# Patient Record
Sex: FEMALE | Race: BLACK OR AFRICAN AMERICAN | NOT HISPANIC OR LATINO | URBAN - METROPOLITAN AREA
[De-identification: names, ages, dates, MRNs, and addresses within clinical notes are randomized per-mention and may not be internally consistent; named-entity substitution may affect disease eponyms.]

---

## 2023-08-14 ENCOUNTER — OFFICE VISIT (OUTPATIENT)
Dept: PRIMARY CARE | Facility: CLINIC | Age: 63
End: 2023-08-14
Payer: COMMERCIAL

## 2023-08-14 ENCOUNTER — LAB (OUTPATIENT)
Dept: LAB | Facility: LAB | Age: 63
End: 2023-08-14
Payer: COMMERCIAL

## 2023-08-14 VITALS
RESPIRATION RATE: 16 BRPM | HEART RATE: 105 BPM | TEMPERATURE: 97 F | DIASTOLIC BLOOD PRESSURE: 64 MMHG | OXYGEN SATURATION: 98 % | WEIGHT: 185.2 LBS | SYSTOLIC BLOOD PRESSURE: 98 MMHG

## 2023-08-14 DIAGNOSIS — G50.0 TRIGEMINAL NEURALGIA: Primary | ICD-10-CM

## 2023-08-14 DIAGNOSIS — E11.65 TYPE 2 DIABETES MELLITUS WITH HYPERGLYCEMIA, WITHOUT LONG-TERM CURRENT USE OF INSULIN (MULTI): ICD-10-CM

## 2023-08-14 DIAGNOSIS — B37.31 YEAST VAGINITIS: ICD-10-CM

## 2023-08-14 DIAGNOSIS — I10 ESSENTIAL HYPERTENSION: ICD-10-CM

## 2023-08-14 DIAGNOSIS — L08.9 SKIN INFECTION: ICD-10-CM

## 2023-08-14 DIAGNOSIS — E53.8 VITAMIN B12 DEFICIENCY: ICD-10-CM

## 2023-08-14 DIAGNOSIS — Z00.00 HEALTHCARE MAINTENANCE: ICD-10-CM

## 2023-08-14 DIAGNOSIS — E78.2 MIXED HYPERLIPIDEMIA: ICD-10-CM

## 2023-08-14 DIAGNOSIS — E55.9 VITAMIN D DEFICIENCY: ICD-10-CM

## 2023-08-14 PROBLEM — F44.9 CONVERSION DISORDER: Status: ACTIVE | Noted: 2020-09-22

## 2023-08-14 PROBLEM — M25.571 PAIN IN JOINTS OF BOTH FEET: Status: ACTIVE | Noted: 2019-05-08

## 2023-08-14 PROBLEM — E66.811 OBESITY, CLASS I, BMI 30-34.9: Status: ACTIVE | Noted: 2020-02-20

## 2023-08-14 PROBLEM — G89.0 CENTRAL PAIN SYNDROME: Status: ACTIVE | Noted: 2019-09-13

## 2023-08-14 PROBLEM — E66.9 OBESITY, CLASS I, BMI 30-34.9: Status: ACTIVE | Noted: 2020-02-20

## 2023-08-14 PROBLEM — M79.674 PAIN IN TOES OF BOTH FEET: Status: ACTIVE | Noted: 2019-09-11

## 2023-08-14 PROBLEM — Q84.6 NAIL ANOMALY: Status: ACTIVE | Noted: 2019-05-08

## 2023-08-14 PROBLEM — H90.3 SENSORINEURAL HEARING LOSS, BILATERAL: Status: ACTIVE | Noted: 2021-08-17

## 2023-08-14 PROBLEM — M79.675 PAIN IN TOES OF BOTH FEET: Status: ACTIVE | Noted: 2019-09-11

## 2023-08-14 PROBLEM — M25.572 PAIN IN JOINTS OF BOTH FEET: Status: ACTIVE | Noted: 2019-05-08

## 2023-08-14 PROBLEM — L84 CORNS AND CALLOSITIES: Status: ACTIVE | Noted: 2017-11-29

## 2023-08-14 LAB
ALANINE AMINOTRANSFERASE (SGPT) (U/L) IN SER/PLAS: 38 U/L (ref 7–45)
ALBUMIN (G/DL) IN SER/PLAS: 4.3 G/DL (ref 3.4–5)
ALBUMIN (MG/L) IN URINE: 38.3 MG/L
ALBUMIN/CREATININE (UG/MG) IN URINE: 51.5 UG/MG CRT (ref 0–30)
ALKALINE PHOSPHATASE (U/L) IN SER/PLAS: 73 U/L (ref 33–136)
ANION GAP IN SER/PLAS: 14 MMOL/L (ref 10–20)
ASPARTATE AMINOTRANSFERASE (SGOT) (U/L) IN SER/PLAS: 34 U/L (ref 9–39)
BASOPHILS (10*3/UL) IN BLOOD BY AUTOMATED COUNT: 0.06 X10E9/L (ref 0–0.1)
BASOPHILS/100 LEUKOCYTES IN BLOOD BY AUTOMATED COUNT: 0.5 % (ref 0–2)
BILIRUBIN TOTAL (MG/DL) IN SER/PLAS: 0.7 MG/DL (ref 0–1.2)
CALCIUM (MG/DL) IN SER/PLAS: 10.3 MG/DL (ref 8.6–10.6)
CARBON DIOXIDE, TOTAL (MMOL/L) IN SER/PLAS: 28 MMOL/L (ref 21–32)
CHLORIDE (MMOL/L) IN SER/PLAS: 99 MMOL/L (ref 98–107)
CHOLESTEROL (MG/DL) IN SER/PLAS: 107 MG/DL (ref 0–199)
CHOLESTEROL IN HDL (MG/DL) IN SER/PLAS: 40.5 MG/DL
CHOLESTEROL/HDL RATIO: 2.6
CREATININE (MG/DL) IN SER/PLAS: 1.15 MG/DL (ref 0.5–1.05)
CREATININE (MG/DL) IN URINE: 74.3 MG/DL (ref 20–320)
EOSINOPHILS (10*3/UL) IN BLOOD BY AUTOMATED COUNT: 0.3 X10E9/L (ref 0–0.7)
EOSINOPHILS/100 LEUKOCYTES IN BLOOD BY AUTOMATED COUNT: 2.7 % (ref 0–6)
ERYTHROCYTE DISTRIBUTION WIDTH (RATIO) BY AUTOMATED COUNT: 12.7 % (ref 11.5–14.5)
ERYTHROCYTE MEAN CORPUSCULAR HEMOGLOBIN CONCENTRATION (G/DL) BY AUTOMATED: 31.6 G/DL (ref 32–36)
ERYTHROCYTE MEAN CORPUSCULAR VOLUME (FL) BY AUTOMATED COUNT: 91 FL (ref 80–100)
ERYTHROCYTES (10*6/UL) IN BLOOD BY AUTOMATED COUNT: 4.6 X10E12/L (ref 4–5.2)
ESTIMATED AVERAGE GLUCOSE FOR HBA1C: 349 MG/DL
GFR FEMALE: 53 ML/MIN/1.73M2
GLUCOSE (MG/DL) IN SER/PLAS: 350 MG/DL (ref 74–99)
HEMATOCRIT (%) IN BLOOD BY AUTOMATED COUNT: 41.8 % (ref 36–46)
HEMOGLOBIN (G/DL) IN BLOOD: 13.2 G/DL (ref 12–16)
HEMOGLOBIN A1C/HEMOGLOBIN TOTAL IN BLOOD: 13.8 %
IMMATURE GRANULOCYTES/100 LEUKOCYTES IN BLOOD BY AUTOMATED COUNT: 0.3 % (ref 0–0.9)
LDL: 41 MG/DL (ref 0–99)
LEUKOCYTES (10*3/UL) IN BLOOD BY AUTOMATED COUNT: 11 X10E9/L (ref 4.4–11.3)
LYMPHOCYTES (10*3/UL) IN BLOOD BY AUTOMATED COUNT: 4.81 X10E9/L (ref 1.2–4.8)
LYMPHOCYTES/100 LEUKOCYTES IN BLOOD BY AUTOMATED COUNT: 43.6 % (ref 13–44)
MONOCYTES (10*3/UL) IN BLOOD BY AUTOMATED COUNT: 0.54 X10E9/L (ref 0.1–1)
MONOCYTES/100 LEUKOCYTES IN BLOOD BY AUTOMATED COUNT: 4.9 % (ref 2–10)
NEUTROPHILS (10*3/UL) IN BLOOD BY AUTOMATED COUNT: 5.29 X10E9/L (ref 1.2–7.7)
NEUTROPHILS/100 LEUKOCYTES IN BLOOD BY AUTOMATED COUNT: 48 % (ref 40–80)
NRBC (PER 100 WBCS) BY AUTOMATED COUNT: 0 /100 WBC (ref 0–0)
PLATELETS (10*3/UL) IN BLOOD AUTOMATED COUNT: 240 X10E9/L (ref 150–450)
POTASSIUM (MMOL/L) IN SER/PLAS: 4.6 MMOL/L (ref 3.5–5.3)
PROTEIN TOTAL: 7.1 G/DL (ref 6.4–8.2)
SODIUM (MMOL/L) IN SER/PLAS: 136 MMOL/L (ref 136–145)
TRIGLYCERIDE (MG/DL) IN SER/PLAS: 126 MG/DL (ref 0–149)
UREA NITROGEN (MG/DL) IN SER/PLAS: 17 MG/DL (ref 6–23)
VLDL: 25 MG/DL (ref 0–40)

## 2023-08-14 PROCEDURE — 1036F TOBACCO NON-USER: CPT | Performed by: STUDENT IN AN ORGANIZED HEALTH CARE EDUCATION/TRAINING PROGRAM

## 2023-08-14 PROCEDURE — 3078F DIAST BP <80 MM HG: CPT | Performed by: STUDENT IN AN ORGANIZED HEALTH CARE EDUCATION/TRAINING PROGRAM

## 2023-08-14 PROCEDURE — 36415 COLL VENOUS BLD VENIPUNCTURE: CPT

## 2023-08-14 PROCEDURE — 82570 ASSAY OF URINE CREATININE: CPT

## 2023-08-14 PROCEDURE — 80061 LIPID PANEL: CPT

## 2023-08-14 PROCEDURE — 99204 OFFICE O/P NEW MOD 45 MIN: CPT | Performed by: STUDENT IN AN ORGANIZED HEALTH CARE EDUCATION/TRAINING PROGRAM

## 2023-08-14 PROCEDURE — 80053 COMPREHEN METABOLIC PANEL: CPT

## 2023-08-14 PROCEDURE — 4010F ACE/ARB THERAPY RXD/TAKEN: CPT | Performed by: STUDENT IN AN ORGANIZED HEALTH CARE EDUCATION/TRAINING PROGRAM

## 2023-08-14 PROCEDURE — 83036 HEMOGLOBIN GLYCOSYLATED A1C: CPT

## 2023-08-14 PROCEDURE — 3074F SYST BP LT 130 MM HG: CPT | Performed by: STUDENT IN AN ORGANIZED HEALTH CARE EDUCATION/TRAINING PROGRAM

## 2023-08-14 PROCEDURE — 85025 COMPLETE CBC W/AUTO DIFF WBC: CPT

## 2023-08-14 PROCEDURE — 82043 UR ALBUMIN QUANTITATIVE: CPT

## 2023-08-14 RX ORDER — LANOLIN ALCOHOL/MO/W.PET/CERES
1 CREAM (GRAM) TOPICAL DAILY
COMMUNITY
Start: 2023-05-01 | End: 2023-08-14 | Stop reason: SDUPTHER

## 2023-08-14 RX ORDER — VIT C/E/ZN/COPPR/LUTEIN/ZEAXAN 250MG-90MG
1000 CAPSULE ORAL
Qty: 90 CAPSULE | Refills: 0 | Status: SHIPPED | OUTPATIENT
Start: 2023-08-14 | End: 2024-01-02 | Stop reason: WASHOUT

## 2023-08-14 RX ORDER — VIT C/E/ZN/COPPR/LUTEIN/ZEAXAN 250MG-90MG
1000 CAPSULE ORAL
COMMUNITY
Start: 2023-07-21 | End: 2023-08-14 | Stop reason: SDUPTHER

## 2023-08-14 RX ORDER — NORTRIPTYLINE HYDROCHLORIDE 50 MG/1
50 CAPSULE ORAL DAILY
COMMUNITY
Start: 2023-08-07

## 2023-08-14 RX ORDER — METFORMIN HYDROCHLORIDE 1000 MG/1
TABLET ORAL
COMMUNITY
Start: 2023-08-04 | End: 2023-09-25 | Stop reason: SDUPTHER

## 2023-08-14 RX ORDER — DOXYCYCLINE 100 MG/1
100 CAPSULE ORAL 2 TIMES DAILY
Qty: 14 CAPSULE | Refills: 0 | Status: SHIPPED | OUTPATIENT
Start: 2023-08-14 | End: 2023-08-21

## 2023-08-14 RX ORDER — LISINOPRIL 20 MG/1
20 TABLET ORAL DAILY
COMMUNITY
Start: 2023-07-21 | End: 2023-09-25 | Stop reason: SDUPTHER

## 2023-08-14 RX ORDER — FLUCONAZOLE 150 MG/1
150 TABLET ORAL ONCE
Qty: 2 TABLET | Refills: 0 | Status: SHIPPED | OUTPATIENT
Start: 2023-08-14 | End: 2023-08-14

## 2023-08-14 RX ORDER — LIDOCAINE 50 MG/G
PATCH TOPICAL
COMMUNITY
Start: 2022-10-20 | End: 2024-01-02 | Stop reason: WASHOUT

## 2023-08-14 RX ORDER — LANOLIN ALCOHOL/MO/W.PET/CERES
1 CREAM (GRAM) TOPICAL DAILY
Qty: 90 TABLET | Refills: 0 | Status: SHIPPED | OUTPATIENT
Start: 2023-08-14 | End: 2023-09-25 | Stop reason: ALTCHOICE

## 2023-08-14 RX ORDER — ZINC GLUCONATE 50 MG
1000 TABLET ORAL DAILY
COMMUNITY
Start: 2023-03-08 | End: 2023-08-14 | Stop reason: SDUPTHER

## 2023-08-14 RX ORDER — SITAGLIPTIN 50 MG/1
TABLET, FILM COATED ORAL
COMMUNITY
Start: 2023-08-14 | End: 2023-09-25 | Stop reason: SDUPTHER

## 2023-08-14 RX ORDER — MULTIVITAMIN WITH FOLIC ACID 400 MCG
1 TABLET ORAL DAILY
COMMUNITY
Start: 2022-09-19 | End: 2024-01-02 | Stop reason: WASHOUT

## 2023-08-14 RX ORDER — NAPROXEN SODIUM 220 MG/1
81 TABLET, FILM COATED ORAL DAILY
COMMUNITY
Start: 2023-03-02 | End: 2023-09-25 | Stop reason: SDUPTHER

## 2023-08-14 RX ORDER — DULAGLUTIDE 0.75 MG/.5ML
INJECTION, SOLUTION SUBCUTANEOUS
COMMUNITY
Start: 2023-07-22 | End: 2023-08-29 | Stop reason: SDUPTHER

## 2023-08-14 RX ORDER — ZINC GLUCONATE 50 MG
1000 TABLET ORAL DAILY
Qty: 30 TABLET | Refills: 0 | Status: SHIPPED | OUTPATIENT
Start: 2023-08-14 | End: 2023-08-31

## 2023-08-14 RX ORDER — AMLODIPINE BESYLATE 5 MG/1
5 TABLET ORAL DAILY
COMMUNITY
Start: 2023-07-21 | End: 2023-09-25 | Stop reason: SDUPTHER

## 2023-08-14 RX ORDER — ROSUVASTATIN CALCIUM 40 MG/1
40 TABLET, COATED ORAL DAILY
COMMUNITY
Start: 2023-07-21 | End: 2023-09-25 | Stop reason: SDUPTHER

## 2023-08-14 RX ORDER — TOPIRAMATE 25 MG/1
TABLET ORAL
Qty: 162 TABLET | Refills: 0 | Status: SHIPPED | OUTPATIENT
Start: 2023-08-14 | End: 2023-08-29 | Stop reason: SINTOL

## 2023-08-14 ASSESSMENT — PATIENT HEALTH QUESTIONNAIRE - PHQ9
2. FEELING DOWN, DEPRESSED OR HOPELESS: NOT AT ALL
1. LITTLE INTEREST OR PLEASURE IN DOING THINGS: NOT AT ALL
SUM OF ALL RESPONSES TO PHQ9 QUESTIONS 1 & 2: 0

## 2023-08-14 ASSESSMENT — LIFESTYLE VARIABLES
HOW OFTEN DO YOU HAVE A DRINK CONTAINING ALCOHOL: NEVER
SKIP TO QUESTIONS 9-10: 1
AUDIT-C TOTAL SCORE: 0
HOW OFTEN DO YOU HAVE SIX OR MORE DRINKS ON ONE OCCASION: NEVER
HOW MANY STANDARD DRINKS CONTAINING ALCOHOL DO YOU HAVE ON A TYPICAL DAY: PATIENT DOES NOT DRINK

## 2023-08-14 NOTE — PATIENT INSTRUCTIONS
Topamax and doxycycline ordered  Referral to neurology   Continue with current medications.  Blood work before your next visit.  If blood work or imaging were ordered during your visit, all the nonurgent lab results will be discussed with you at your next office visit.  Please arrive 15 minutes before your appointment.   Return to office in 2-3 weeks or as needed       Sent Topiramate 25mg to your pharmacy:  - Use 1 tablet (25mg) at night X 1 wk  - Then use 2 tablet (50mg) at night X 1 wk  - Then use 3 tablet (75mg) at night X 1 wk  - Then use 4 tablet (100mg) at night there after. Let me know if you still have pain    Side effects for topiramate include:  - Drowsiness or dizziness  - Memory or thinking trouble  - Tingling around the mouth and fingertips (usually temporary)  - A rare form of glaucoma (increased eye pressure) (extremely uncommon)  - Weight loss  - Nausea, vomiting  - Kidney stones  - Interaction with birth control pills and other hormones making them less effective

## 2023-08-14 NOTE — PROGRESS NOTES
Subjective   Patient ID: Aury Lau is a 63 y.o. female with significant history of sensorineural hearing loss bilateral, hyperlipidemia, hypertension, diabetes, depression, central pain syndrome, CVA, left-sided trigeminal neuralgia, cerebral artery occlusion with cerebral infarction, stroke with dysphonia who presents for Diabetes (Type 2) and trigemal neurologia.  HPI  Patient is here to establish care.  She also is concerned about lesion in the right vaginal area that has been present for 2 weeks.  Patient is mainly communicating through writing as speaking with elicit significant pain in the left side of the her face due to trigeminal neuralgia.  Reports that she has had the lesion on the outer vaginal area for about 2 weeks.  No Drainage  ,, occasionally tender.  No fever or chills.  She has also noted vaginal and anal itching.  Has used topical antifungal with no significant relief.    Due for blood work    Concerned about L trigeminal neuralgia. She is on nortriptyline.  She was previously seeing Dr. Garcia at [facility].  Reports that the nortriptyline dose was increased to 150 mg earlier last year.  She has been dealing with this for 9 years. Feels that lately it is getting worse. She can only speak 2-3 word before the pain is exacerbated.      Review of Systems   All other systems reviewed and are negative.      Visit Vitals  BP 98/64   Pulse 105   Temp 36.1 °C (97 °F)   Resp 16          Objective   Physical Exam  Constitutional:       General: She is not in acute distress.     Appearance: Normal appearance.   HENT:      Head: Normocephalic and atraumatic.   Eyes:      General: No scleral icterus.     Conjunctiva/sclera: Conjunctivae normal.   Cardiovascular:      Rate and Rhythm: Normal rate and regular rhythm.      Heart sounds: Normal heart sounds.   Pulmonary:      Effort: Pulmonary effort is normal.      Breath sounds: Normal breath sounds. No wheezing.   Abdominal:      General: Bowel sounds  are normal. There is no distension.      Palpations: Abdomen is soft.      Tenderness: There is no abdominal tenderness.   Musculoskeletal:      Cervical back: Neck supple.      Right lower leg: No edema.      Left lower leg: No edema.   Lymphadenopathy:      Cervical: No cervical adenopathy.   Skin:     General: Skin is warm and dry.      Comments: Small superficial lump in the vaginal area   Neurological:      General: No focal deficit present.      Mental Status: She is alert and oriented to person, place, and time.   Psychiatric:         Mood and Affect: Mood normal.         Behavior: Behavior normal.         Assessment/Plan   Problem List Items Addressed This Visit       Essential hypertension    Mixed hyperlipidemia    Type 2 diabetes mellitus (CMS/HCC)    Relevant Orders    Comprehensive Metabolic Panel    CBC and Auto Differential    Hemoglobin A1C    Lipid Panel    Albumin , Urine Random     Other Visit Diagnoses       Trigeminal neuralgia    -  Primary    Relevant Medications    topiramate (Topamax) 25 mg tablet    Other Relevant Orders    Referral to Neurology    Vitamin D deficiency        Relevant Medications    cholecalciferol (Vitamin D-3) 25 MCG (1000 UT) capsule    Vitamin B12 deficiency        Relevant Medications    Vitamin B-12 1,000 mcg tablet    Healthcare maintenance        Relevant Medications    magnesium oxide (Mag-Ox) 400 mg (241.3 mg magnesium) tablet    Skin infection        Relevant Medications    doxycycline (Vibramycin) 100 mg capsule    Yeast vaginitis        Relevant Medications    fluconazole (Diflucan) 150 mg tablet

## 2023-08-15 DIAGNOSIS — E11.65 TYPE 2 DIABETES MELLITUS WITH HYPERGLYCEMIA, WITHOUT LONG-TERM CURRENT USE OF INSULIN (MULTI): Primary | ICD-10-CM

## 2023-08-18 ENCOUNTER — TELEPHONE (OUTPATIENT)
Dept: PRIMARY CARE | Facility: CLINIC | Age: 63
End: 2023-08-18
Payer: COMMERCIAL

## 2023-08-18 NOTE — TELEPHONE ENCOUNTER
----- Message from Valarie Vides MD sent at 8/15/2023  8:04 AM EDT -----  Please call the patient.  Her HbA1c is at 13% which is higher than when she was at [facility].  I would like for her to establish care with endocrinology at .  I placed a referral for her.  Please advise the patient to schedule an appointment with endocrinology.

## 2023-08-28 DIAGNOSIS — E53.8 VITAMIN B12 DEFICIENCY: ICD-10-CM

## 2023-08-29 ENCOUNTER — OFFICE VISIT (OUTPATIENT)
Dept: PRIMARY CARE | Facility: CLINIC | Age: 63
End: 2023-08-29
Payer: COMMERCIAL

## 2023-08-29 VITALS
DIASTOLIC BLOOD PRESSURE: 64 MMHG | RESPIRATION RATE: 15 BRPM | BODY MASS INDEX: 26.93 KG/M2 | OXYGEN SATURATION: 97 % | WEIGHT: 177.7 LBS | SYSTOLIC BLOOD PRESSURE: 110 MMHG | HEART RATE: 107 BPM | HEIGHT: 68 IN | TEMPERATURE: 97 F

## 2023-08-29 DIAGNOSIS — T88.7XXA MEDICATION SIDE EFFECT: ICD-10-CM

## 2023-08-29 DIAGNOSIS — E11.65 TYPE 2 DIABETES MELLITUS WITH HYPERGLYCEMIA, WITHOUT LONG-TERM CURRENT USE OF INSULIN (MULTI): ICD-10-CM

## 2023-08-29 DIAGNOSIS — G50.0 TRIGEMINAL NEURALGIA: Primary | ICD-10-CM

## 2023-08-29 PROCEDURE — 3046F HEMOGLOBIN A1C LEVEL >9.0%: CPT | Performed by: STUDENT IN AN ORGANIZED HEALTH CARE EDUCATION/TRAINING PROGRAM

## 2023-08-29 PROCEDURE — 1036F TOBACCO NON-USER: CPT | Performed by: STUDENT IN AN ORGANIZED HEALTH CARE EDUCATION/TRAINING PROGRAM

## 2023-08-29 PROCEDURE — 4010F ACE/ARB THERAPY RXD/TAKEN: CPT | Performed by: STUDENT IN AN ORGANIZED HEALTH CARE EDUCATION/TRAINING PROGRAM

## 2023-08-29 PROCEDURE — 3078F DIAST BP <80 MM HG: CPT | Performed by: STUDENT IN AN ORGANIZED HEALTH CARE EDUCATION/TRAINING PROGRAM

## 2023-08-29 PROCEDURE — 99214 OFFICE O/P EST MOD 30 MIN: CPT | Performed by: STUDENT IN AN ORGANIZED HEALTH CARE EDUCATION/TRAINING PROGRAM

## 2023-08-29 PROCEDURE — 1159F MED LIST DOCD IN RCRD: CPT | Performed by: STUDENT IN AN ORGANIZED HEALTH CARE EDUCATION/TRAINING PROGRAM

## 2023-08-29 PROCEDURE — 3074F SYST BP LT 130 MM HG: CPT | Performed by: STUDENT IN AN ORGANIZED HEALTH CARE EDUCATION/TRAINING PROGRAM

## 2023-08-29 RX ORDER — DULAGLUTIDE 1.5 MG/.5ML
1.5 INJECTION, SOLUTION SUBCUTANEOUS
Qty: 2 ML | Refills: 11 | Status: SHIPPED | OUTPATIENT
Start: 2023-08-29 | End: 2023-10-04 | Stop reason: ALTCHOICE

## 2023-08-29 RX ORDER — TIZANIDINE 2 MG/1
2 TABLET ORAL EVERY 6 HOURS PRN
Qty: 30 TABLET | Refills: 0 | Status: SHIPPED | OUTPATIENT
Start: 2023-08-29 | End: 2023-09-25 | Stop reason: SDUPTHER

## 2023-08-29 NOTE — PROGRESS NOTES
Subjective   Patient ID: Aury Lau is a 63 y.o. female w hx of trigeminal neuralgia, HTN , DM who presents for medication concerns.  HPI  She was seen in the office on 8/14 as a new patient.  She was started on topiramate for trigeminal neuralgia and was referred to neurology.  She reports that she has been experiencing dizzy and drowsiness with topiramate 25 mg which she takes at bedtime with her nortriptyline.  Reports that the topiramate has been controlling her pain much better however she is concerned that due to the side effects she is not able to continue with the medication.  She remains on nortriptyline 150 mg   Took gabapentin in the past which didn't help.  She was also given baclofen in the past which did not alleviate her pain.  She has an upcoming appointment with neurology in October.  Also her HbA1c is at 13%.  She has an appointment coming up with endocrinology.  We discussed about increasing her Trulicity to 1.5 mg weekly which she is agreeable with.    Review of Systems   All other systems reviewed and are negative.      Visit Vitals  /64   Pulse 107   Temp 36.1 °C (97 °F)   Resp 15          Objective   Physical Exam  Constitutional:       General: She is not in acute distress.     Appearance: Normal appearance. She is well-developed and well-groomed.   HENT:      Head: Normocephalic and atraumatic.   Eyes:      General: Lids are normal. No scleral icterus.     Conjunctiva/sclera: Conjunctivae normal.   Pulmonary:      Effort: Pulmonary effort is normal. No accessory muscle usage.   Skin:     General: Skin is warm and dry.   Neurological:      Mental Status: She is alert and oriented to person, place, and time. Mental status is at baseline.   Psychiatric:         Attention and Perception: Attention normal.         Mood and Affect: Mood and affect normal.         Speech: Speech normal.         Behavior: Behavior normal.         Thought Content: Thought content normal.         Cognition and  Memory: Cognition and memory normal.         Judgment: Judgment normal.         Assessment/Plan   Problem List Items Addressed This Visit       Type 2 diabetes mellitus (CMS/HCC)    Relevant Medications    dulaglutide (Trulicity) 1.5 mg/0.5 mL pen injector injection     Other Visit Diagnoses       Trigeminal neuralgia    -  Primary    Relevant Medications    tiZANidine (Zanaflex) 2 mg tablet    Medication side effect

## 2023-08-29 NOTE — PATIENT INSTRUCTIONS
Stop topiramate and start Tizanidine   See neurology in OCT  I increased your Trulicity to 1.5 mg weekly   Follow up with me in 2-3 months or as needed

## 2023-08-31 RX ORDER — ZINC GLUCONATE 50 MG
1000 TABLET ORAL DAILY
Qty: 90 TABLET | Refills: 1 | Status: SHIPPED | OUTPATIENT
Start: 2023-08-31 | End: 2024-02-20 | Stop reason: ALTCHOICE

## 2023-09-25 ENCOUNTER — OFFICE VISIT (OUTPATIENT)
Dept: PRIMARY CARE | Facility: CLINIC | Age: 63
End: 2023-09-25
Payer: COMMERCIAL

## 2023-09-25 VITALS
HEART RATE: 101 BPM | TEMPERATURE: 93.6 F | SYSTOLIC BLOOD PRESSURE: 110 MMHG | WEIGHT: 178.5 LBS | DIASTOLIC BLOOD PRESSURE: 62 MMHG | BODY MASS INDEX: 27.05 KG/M2 | OXYGEN SATURATION: 99 % | HEIGHT: 68 IN | RESPIRATION RATE: 18 BRPM

## 2023-09-25 DIAGNOSIS — Z01.419 WELL WOMAN EXAM: ICD-10-CM

## 2023-09-25 DIAGNOSIS — G50.0 TRIGEMINAL NEURALGIA: Primary | ICD-10-CM

## 2023-09-25 DIAGNOSIS — I10 ESSENTIAL HYPERTENSION: ICD-10-CM

## 2023-09-25 DIAGNOSIS — E11.65 TYPE 2 DIABETES MELLITUS WITH HYPERGLYCEMIA, WITHOUT LONG-TERM CURRENT USE OF INSULIN (MULTI): ICD-10-CM

## 2023-09-25 DIAGNOSIS — B37.31 YEAST VAGINITIS: ICD-10-CM

## 2023-09-25 DIAGNOSIS — E78.2 MIXED HYPERLIPIDEMIA: ICD-10-CM

## 2023-09-25 DIAGNOSIS — F32.1 MAJOR DEPRESSIVE DISORDER, SINGLE EPISODE, MODERATE (MULTI): ICD-10-CM

## 2023-09-25 PROCEDURE — 3046F HEMOGLOBIN A1C LEVEL >9.0%: CPT | Performed by: STUDENT IN AN ORGANIZED HEALTH CARE EDUCATION/TRAINING PROGRAM

## 2023-09-25 PROCEDURE — 1160F RVW MEDS BY RX/DR IN RCRD: CPT | Performed by: STUDENT IN AN ORGANIZED HEALTH CARE EDUCATION/TRAINING PROGRAM

## 2023-09-25 PROCEDURE — 1036F TOBACCO NON-USER: CPT | Performed by: STUDENT IN AN ORGANIZED HEALTH CARE EDUCATION/TRAINING PROGRAM

## 2023-09-25 PROCEDURE — 4010F ACE/ARB THERAPY RXD/TAKEN: CPT | Performed by: STUDENT IN AN ORGANIZED HEALTH CARE EDUCATION/TRAINING PROGRAM

## 2023-09-25 PROCEDURE — 1159F MED LIST DOCD IN RCRD: CPT | Performed by: STUDENT IN AN ORGANIZED HEALTH CARE EDUCATION/TRAINING PROGRAM

## 2023-09-25 PROCEDURE — 3074F SYST BP LT 130 MM HG: CPT | Performed by: STUDENT IN AN ORGANIZED HEALTH CARE EDUCATION/TRAINING PROGRAM

## 2023-09-25 PROCEDURE — 3078F DIAST BP <80 MM HG: CPT | Performed by: STUDENT IN AN ORGANIZED HEALTH CARE EDUCATION/TRAINING PROGRAM

## 2023-09-25 PROCEDURE — 99215 OFFICE O/P EST HI 40 MIN: CPT | Performed by: STUDENT IN AN ORGANIZED HEALTH CARE EDUCATION/TRAINING PROGRAM

## 2023-09-25 RX ORDER — SITAGLIPTIN 50 MG/1
TABLET, FILM COATED ORAL
Qty: 90 TABLET | Refills: 1 | Status: SHIPPED | OUTPATIENT
Start: 2023-09-25 | End: 2023-10-04 | Stop reason: ALTCHOICE

## 2023-09-25 RX ORDER — FLASH GLUCOSE SENSOR
KIT MISCELLANEOUS
Qty: 1 EACH | Refills: 0 | Status: SHIPPED | OUTPATIENT
Start: 2023-09-25 | End: 2023-10-04 | Stop reason: ALTCHOICE

## 2023-09-25 RX ORDER — ROSUVASTATIN CALCIUM 40 MG/1
40 TABLET, COATED ORAL DAILY
Qty: 90 TABLET | Refills: 1 | Status: SHIPPED | OUTPATIENT
Start: 2023-09-25 | End: 2024-02-02

## 2023-09-25 RX ORDER — BLOOD SUGAR DIAGNOSTIC
STRIP MISCELLANEOUS
Qty: 100 STRIP | Refills: 1 | Status: SHIPPED | OUTPATIENT
Start: 2023-09-25 | End: 2023-10-25 | Stop reason: ALTCHOICE

## 2023-09-25 RX ORDER — FLUCONAZOLE 150 MG/1
150 TABLET ORAL ONCE
Qty: 1 TABLET | Refills: 1 | Status: SHIPPED | OUTPATIENT
Start: 2023-09-25 | End: 2023-09-25

## 2023-09-25 RX ORDER — BLOOD-GLUCOSE CONTROL, NORMAL
EACH MISCELLANEOUS
Qty: 1 EACH | Refills: 0 | Status: SHIPPED | OUTPATIENT
Start: 2023-09-25 | End: 2023-11-14 | Stop reason: ALTCHOICE

## 2023-09-25 RX ORDER — NAPROXEN SODIUM 220 MG/1
81 TABLET, FILM COATED ORAL DAILY
Qty: 90 TABLET | Refills: 0 | Status: SHIPPED | OUTPATIENT
Start: 2023-09-25 | End: 2023-12-24

## 2023-09-25 RX ORDER — AMLODIPINE BESYLATE 5 MG/1
5 TABLET ORAL DAILY
Qty: 30 TABLET | Refills: 2 | Status: SHIPPED | OUTPATIENT
Start: 2023-09-25 | End: 2024-02-20

## 2023-09-25 RX ORDER — FLASH GLUCOSE SCANNING READER
EACH MISCELLANEOUS
Qty: 1 EACH | Refills: 0 | Status: SHIPPED | OUTPATIENT
Start: 2023-09-25 | End: 2023-10-04 | Stop reason: ALTCHOICE

## 2023-09-25 RX ORDER — TIZANIDINE 2 MG/1
2 TABLET ORAL EVERY 6 HOURS PRN
Qty: 30 TABLET | Refills: 0 | Status: SHIPPED | OUTPATIENT
Start: 2023-09-25 | End: 2023-11-14 | Stop reason: SDUPTHER

## 2023-09-25 RX ORDER — LISINOPRIL 20 MG/1
20 TABLET ORAL DAILY
Qty: 90 TABLET | Refills: 1 | Status: SHIPPED | OUTPATIENT
Start: 2023-09-25 | End: 2024-02-02

## 2023-09-25 RX ORDER — METFORMIN HYDROCHLORIDE 1000 MG/1
TABLET ORAL
Qty: 90 TABLET | Refills: 1 | Status: SHIPPED | OUTPATIENT
Start: 2023-09-25 | End: 2024-02-02

## 2023-09-25 ASSESSMENT — PATIENT HEALTH QUESTIONNAIRE - PHQ9
10. IF YOU CHECKED OFF ANY PROBLEMS, HOW DIFFICULT HAVE THESE PROBLEMS MADE IT FOR YOU TO DO YOUR WORK, TAKE CARE OF THINGS AT HOME, OR GET ALONG WITH OTHER PEOPLE: VERY DIFFICULT
1. LITTLE INTEREST OR PLEASURE IN DOING THINGS: SEVERAL DAYS
2. FEELING DOWN, DEPRESSED OR HOPELESS: SEVERAL DAYS
SUM OF ALL RESPONSES TO PHQ9 QUESTIONS 1 & 2: 2

## 2023-09-25 ASSESSMENT — LIFESTYLE VARIABLES
HOW MANY STANDARD DRINKS CONTAINING ALCOHOL DO YOU HAVE ON A TYPICAL DAY: PATIENT DOES NOT DRINK
AUDIT-C TOTAL SCORE: 0
HOW OFTEN DO YOU HAVE SIX OR MORE DRINKS ON ONE OCCASION: NEVER
HOW OFTEN DO YOU HAVE A DRINK CONTAINING ALCOHOL: NEVER
SKIP TO QUESTIONS 9-10: 1

## 2023-09-25 NOTE — PATIENT INSTRUCTIONS
Referral to GYN , psychiatry and our clinical pharmacy   Continue with current medications.  Freestyle renetta ordered   If blood work or imaging were ordered during your visit, all the nonurgent lab results will be discussed with you at your next office visit.  Please arrive 15 minutes before your appointment.   Return to office in 1 months or as needed

## 2023-09-25 NOTE — PROGRESS NOTES
Subjective   Patient ID: Aury Lau is a 63 y.o. female who presents for Follow-up (Trigeminal neuralgia).  HPI  Patient is here for follow-up.  She has trigeminal neuralgia.  She was prescribed topiramate which she did not tolerate as she experienced dizziness and lightheadedness with the medication.  She was prescribed tizanidine.  She has an upcoming appointment with neurology in October.  She states she has been feeling tired and becoming forgetful with Tizanidine . But it does help with the pain. Discussed about cutting the dose in half which she is agreeable with.    Also noted that her HbA1c is significantly elevated.  Last HbA1c in August was 13%.  We increased her Trulicity dose to 1.5 mg however she reports that she is unable to tolerate the Trulicity 1.5 mg and she has noted a knot at the site of injection that last about 2 days before subsiding. She has been giving the injections weekly in the same general location in the left leg. She has had localized itchininess with trulicity. She had the same reaction to lower dose of Trulicity . She is also on metformin and Januvia.  She has been taking metformin 1000 mg twice daily however occasionally she forgets to take the second dose.  Her last GFR was 53.  She gets frequent yeast infections.   Patient was provided with freestyle renetta 2 sensor.  New prescription sent to the pharmacy.  She has an upcoming appointment with endocrinology in November.    She also reports that lately she has been feeling more depressed.  Denies any thoughts or feelings of suicidal ideations.  She would like to see a psychiatrist for this.    Review of Systems   All other systems reviewed and are negative.      Visit Vitals  /62   Pulse 101   Temp 34.2 °C (93.6 °F)   Resp 18          Objective   Physical Exam  Constitutional:       General: She is not in acute distress.     Appearance: Normal appearance.   HENT:      Head: Normocephalic and atraumatic.   Eyes:      General:  No scleral icterus.     Conjunctiva/sclera: Conjunctivae normal.   Cardiovascular:      Rate and Rhythm: Normal rate and regular rhythm.      Heart sounds: Normal heart sounds.   Pulmonary:      Effort: Pulmonary effort is normal.      Breath sounds: Normal breath sounds. No wheezing.   Abdominal:      General: Bowel sounds are normal. There is no distension.      Palpations: Abdomen is soft.      Tenderness: There is no abdominal tenderness.   Musculoskeletal:      Cervical back: Neck supple.      Right lower leg: No edema.      Left lower leg: No edema.   Lymphadenopathy:      Cervical: No cervical adenopathy.   Skin:     General: Skin is warm and dry.   Neurological:      General: No focal deficit present.      Mental Status: She is alert and oriented to person, place, and time.   Psychiatric:         Mood and Affect: Mood normal.         Behavior: Behavior normal.         Assessment/Plan   Problem List Items Addressed This Visit       Essential hypertension    Relevant Medications    amLODIPine (Norvasc) 5 mg tablet    aspirin 81 mg chewable tablet    lisinopril 20 mg tablet    Major depressive disorder, single episode, moderate (CMS/HCC)    Relevant Orders    Referral to Psychiatry    Mixed hyperlipidemia    Relevant Medications    rosuvastatin (Crestor) 40 mg tablet    Type 2 diabetes mellitus (CMS/HCC)    Relevant Medications    Januvia 50 mg tablet    metFORMIN (Glucophage) 1,000 mg tablet    OneTouch Ultra Test strip    FreeStyle Kinsey sensor system (FreeStyle Kinsey 14 Day Sensor) kit    FreeStyle Kinsey reader (FreeStyle Kinsey 14 Day [place]) misc    blood glucose control high,low (FreeStyle Control) solution    Other Relevant Orders    Follow Up In [facility] - Pharmacy     Other Visit Diagnoses       Trigeminal neuralgia    -  Primary    Relevant Medications    tiZANidine (Zanaflex) 2 mg tablet    Yeast vaginitis        Relevant Medications    fluconazole (Diflucan) 150 mg tablet    Well woman  exam        Relevant Orders    Referral to Obstetrics / Gynecology

## 2023-09-28 PROBLEM — M79.674 PAIN IN TOES OF BOTH FEET: Status: RESOLVED | Noted: 2019-09-11 | Resolved: 2023-09-28

## 2023-09-28 PROBLEM — M25.572 PAIN IN JOINTS OF BOTH FEET: Status: RESOLVED | Noted: 2019-05-08 | Resolved: 2023-09-28

## 2023-09-28 PROBLEM — M79.675 PAIN IN TOES OF BOTH FEET: Status: RESOLVED | Noted: 2019-09-11 | Resolved: 2023-09-28

## 2023-09-28 PROBLEM — M25.571 PAIN IN JOINTS OF BOTH FEET: Status: RESOLVED | Noted: 2019-05-08 | Resolved: 2023-09-28

## 2023-10-04 ENCOUNTER — TELEMEDICINE CLINICAL SUPPORT (OUTPATIENT)
Dept: PHARMACY | Facility: HOSPITAL | Age: 63
End: 2023-10-04
Payer: COMMERCIAL

## 2023-10-04 DIAGNOSIS — E11.65 TYPE 2 DIABETES MELLITUS WITH HYPERGLYCEMIA, WITHOUT LONG-TERM CURRENT USE OF INSULIN (MULTI): Primary | ICD-10-CM

## 2023-10-04 RX ORDER — FLASH GLUCOSE SENSOR
KIT MISCELLANEOUS
Qty: 2 EACH | Refills: 11 | Status: SHIPPED | OUTPATIENT
Start: 2023-10-04 | End: 2023-11-14 | Stop reason: SDUPTHER

## 2023-10-04 RX ORDER — DULAGLUTIDE 3 MG/.5ML
3 INJECTION, SOLUTION SUBCUTANEOUS
Qty: 2 ML | Refills: 2 | Status: SHIPPED | OUTPATIENT
Start: 2023-10-04 | End: 2023-11-08 | Stop reason: SINTOL

## 2023-10-04 RX ORDER — FLASH GLUCOSE SCANNING READER
EACH MISCELLANEOUS
Qty: 1 EACH | Refills: 0 | Status: SHIPPED | OUTPATIENT
Start: 2023-10-04 | End: 2023-11-14 | Stop reason: SDUPTHER

## 2023-10-04 NOTE — PROGRESS NOTES
Subjective     Patient ID: Aury Lau is a 63 y.o. female who presents for Diabetes.    Diabetes  She presents for her initial diabetic visit. She has type 2 diabetes mellitus.       Allergies   Allergen Reactions    Gadobutrol Itching     This is to MRI contrast. Endy Payton    Grape Other     Grapes=Dizziness.    Iodides Hives    Iodinated Contrast Media Hives    Nut - Unspecified Diarrhea, Swelling and Other     Dizziness peanuts    Other Itching    Penicillins Hives and Itching    Raspberry Itching    Strawberry Itching       Objective     Current DM Pharmacotherapy:   Metformin 1000 mg; take 1 tablet by mouth daily  Januvia 50 mg; take 1 tablet by mouth daily  Trulicity 1.5 mg/0.5 mL dose; inject 1.5 mg under the skin once weekly    SECONDARY PREVENTION  - Statin? Yes  - ACE-I/ARB? Yes  - Aspirin? Yes    Current monitoring regimen:   Patient is using: continuous glucose monitor. Patient had not been able to  the FreeStyle Kinsey, she states that when she went to pick it up the pharmacy gave her test strips, not the Kinsey system. So she has not been able to check her BG levels at this time.      There were no vitals taken for this visit.    Pertinent PMH Review:  - PMH of Pancreatitis: No  - PMH/FH of Medullary Thyroid Cancer: No  - PMH of Retinopathy: No  - PMH of Urinary Tract Infections: patient has a history of frequent yeast infections.    Lab Review  Lab Results   Component Value Date    BILITOT 0.7 08/14/2023    CALCIUM 10.3 08/14/2023    CO2 28 08/14/2023    CL 99 08/14/2023    CREATININE 1.15 (H) 08/14/2023    GLUCOSE 350 (H) 08/14/2023    ALKPHOS 73 08/14/2023    K 4.6 08/14/2023    PROT 7.1 08/14/2023     08/14/2023    AST 34 08/14/2023    ALT 38 08/14/2023    BUN 17 08/14/2023    ANIONGAP 14 08/14/2023    ALBUMIN 4.3 08/14/2023    GFRF 53 (A) 08/14/2023     Lab Results   Component Value Date    TRIG 126 08/14/2023    CHOL 107 08/14/2023    HDL 40.5 08/14/2023     Lab Results   Component  Value Date    HGBA1C 13.8 (A) 08/14/2023     The ASCVD Risk score (Jyoti MEDEL, et al., 2019) failed to calculate for the following reasons:    The patient has a prior MI or stroke diagnosis      Assessment/Plan     Problem List Items Addressed This Visit       Type 2 diabetes mellitus (CMS/HCC) - Primary     Patient's diabetes is not currently well controlled. Her most recent A1c from 8/14/23 was 13.8%, which is above goal of <7%. 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   Patient is taking metformin 1000 mg once a day, per note from Dr. Vides on 9/25/23, patient was supposed to take it twice a day but sometimes forgets to take the second dose. Dr. Vides told her to only take the metformin once a day. Will talk to patient about switching to Metformin XR at future visits.   Spoke to patient about injection technique with her Trulicity, reminded patient of the importance of switching injection sites weekly. Per Dr. Vides's note from 9/25/23, she has been experiencing injection site reactions, a knot at the injection site that lasts about 2 days before resolving.   Informed patient to talk to the [facility] about the FreeStyle Kinsey system that was sent in by Dr. Vides last week to ensure there are no problems for it to be filled so patient can use it to monitor BG levels.     PLAN:    Start  Trulicity 3 mg/0.5 mL; inject 3 mg under the skin weekly.     Stop  Januvia 50 mg; take 1 tablet by mouth daily    Continue:  Metformin 1000 mg; take 1 tablet by mouth daily            Follow up: I recommend diabetes care be 3 weeks.    Shahrzad Ortiz, Pharm. D.  PGY-1 Pharmacy Resident    Continue all meds under the continuation of care with the referring provider and clinical pharmacy team

## 2023-10-25 ENCOUNTER — TELEMEDICINE (OUTPATIENT)
Dept: PHARMACY | Facility: HOSPITAL | Age: 63
End: 2023-10-25
Payer: COMMERCIAL

## 2023-10-25 DIAGNOSIS — E11.65 TYPE 2 DIABETES MELLITUS WITH HYPERGLYCEMIA, WITHOUT LONG-TERM CURRENT USE OF INSULIN (MULTI): Primary | ICD-10-CM

## 2023-10-25 RX ORDER — BLOOD SUGAR DIAGNOSTIC
1 STRIP MISCELLANEOUS DAILY
Qty: 100 STRIP | Refills: 3 | Status: SHIPPED | OUTPATIENT
Start: 2023-10-25 | End: 2023-12-27 | Stop reason: ALTCHOICE

## 2023-10-25 RX ORDER — INSULIN PUMP SYRINGE, 3 ML
1 EACH MISCELLANEOUS AS NEEDED
Qty: 1 EACH | Refills: 0 | Status: SHIPPED | OUTPATIENT
Start: 2023-10-25 | End: 2023-12-27 | Stop reason: ALTCHOICE

## 2023-10-25 RX ORDER — LANCETS
EACH MISCELLANEOUS
Qty: 100 EACH | Refills: 3 | Status: SHIPPED | OUTPATIENT
Start: 2023-10-25 | End: 2023-12-27 | Stop reason: ALTCHOICE

## 2023-10-25 NOTE — PROGRESS NOTES
Subjective     Patient ID: Aury Lau is a 63 y.o. female who presents for Diabetes.    Referring Provider: Valarie Vides MD     Patient was trying to get a CGM to measure her BG, but the prior authorization was denied by the insurance. Will try a traditional glucometer again.    Diabetes  She presents for her follow-up diabetic visit. She has type 2 diabetes mellitus.       Allergies   Allergen Reactions    Gadobutrol Itching     This is to MRI contrast. Endy Payton    Grape Other     Grapes=Dizziness.    Iodides Hives    Iodinated Contrast Media Hives    Nut - Unspecified Diarrhea, Swelling and Other     Dizziness peanuts    Other Itching    Penicillins Hives and Itching    Raspberry Itching    Strawberry Itching       Objective     Current DM Pharmacotherapy:   Metformin 1000 mg; take 1 tablet by mouth daily  Trulicity 3 mg/0.5 mL; inject 3 mg under the skin weekly on Sundays    SECONDARY PREVENTION  - Statin? Yes  - ACE-I/ARB? Yes  - Aspirin? Yes    Current monitoring regimen:   Patient was trying to get a CGM to monitor BG levels, the PA was denied by insurance, will try a traditional glucometer again.     There were no vitals taken for this visit.    Pertinent PMH Review:  - PMH of Pancreatitis: No  - PMH/FH of Medullary Thyroid Cancer: No  - PMH of Retinopathy: No  - PMH of Urinary Tract Infections: patient has a history of frequent yeast infections.     Lab Review  Lab Results   Component Value Date    BILITOT 0.7 08/14/2023    CALCIUM 10.3 08/14/2023    CO2 28 08/14/2023    CL 99 08/14/2023    CREATININE 1.15 (H) 08/14/2023    GLUCOSE 350 (H) 08/14/2023    ALKPHOS 73 08/14/2023    K 4.6 08/14/2023    PROT 7.1 08/14/2023     08/14/2023    AST 34 08/14/2023    ALT 38 08/14/2023    BUN 17 08/14/2023    ANIONGAP 14 08/14/2023    ALBUMIN 4.3 08/14/2023    GFRF 53 (A) 08/14/2023     Lab Results   Component Value Date    TRIG 126 08/14/2023    CHOL 107 08/14/2023    HDL 40.5 08/14/2023     Lab Results    Component Value Date    HGBA1C 13.8 (A) 08/14/2023     The ASCVD Risk score (Jyoti MEDEL, et al., 2019) failed to calculate for the following reasons:    The patient has a prior MI or stroke diagnosis      Assessment/Plan     Problem List Items Addressed This Visit       Type 2 diabetes mellitus (CMS/[facility]) - Primary     Patient's diabetes is not currently well controlled. Her most recent A1c from 8/14/23 was 13.8% which is above the goal of <7%. Patient was wanting a CGM to monitor her BG levels but the insurance company denied the PA since she is not on insulin. Patient wants a traditional glucometer sent to [facility].    PLAN  Start  Glucometer to monitor BG levels    Continue  1. Trulicity 3 mg/0.5 mL; inject 3 mg under skin weekly  2. Metformin 1000 mg; take 1 tablet by mouth daily         Relevant Orders    Follow Up In [facility] - Pharmacy       Follow up: I recommend diabetes care be 2 weeks.    Shahrzad Ortiz, Pharm. D.  PGY-1 Pharmacy Resident    Continue all meds under the continuation of care with the referring provider and clinical pharmacy team

## 2023-10-25 NOTE — ASSESSMENT & PLAN NOTE
Patient's diabetes is not currently well controlled. Her most recent A1c from 8/14/23 was 13.8% which is above the goal of <7%. Patient was wanting a CGM to monitor her BG levels but the insurance company denied the PA since she is not on insulin. Patient wants a traditional glucometer sent to [facility].    PLAN  Start  Glucometer to monitor BG levels    Continue  1. Trulicity 3 mg/0.5 mL; inject 3 mg under skin weekly  2. Metformin 1000 mg; take 1 tablet by mouth daily

## 2023-11-08 ENCOUNTER — TELEMEDICINE (OUTPATIENT)
Dept: PHARMACY | Facility: HOSPITAL | Age: 63
End: 2023-11-08
Payer: COMMERCIAL

## 2023-11-08 DIAGNOSIS — E11.65 TYPE 2 DIABETES MELLITUS WITH HYPERGLYCEMIA, WITHOUT LONG-TERM CURRENT USE OF INSULIN (MULTI): ICD-10-CM

## 2023-11-08 RX ORDER — SEMAGLUTIDE 0.68 MG/ML
0.5 INJECTION, SOLUTION SUBCUTANEOUS
Qty: 3 ML | Refills: 1 | Status: SHIPPED | OUTPATIENT
Start: 2023-11-08 | End: 2023-12-27 | Stop reason: ALTCHOICE

## 2023-11-08 NOTE — ASSESSMENT & PLAN NOTE
Patient's diabetes is not under control. Patient's most recent A1c from 8/14/23 was 13.8 which is above the goal os <7%.   Patient states that she has not picked up the glucometer from [facility], informed patient that she should be able to pick it up at any time.   Patient reports that she stopped the Trulicity because it caused her to to itch and break out. Willing to try Ozempic, since patient has been on Trulicity 3 mg previously, will start patient with Ozempic 0.5 mg weekly.    PLAN:  Start:  Ozempic 0.25 or 0.5 mg/dose; inject 0.5 mg under the skin weekly  Continue:  Metformin 1000 mg; take 1 tablet by mouth daily

## 2023-11-08 NOTE — PROGRESS NOTES
Subjective     Patient ID: Aury Lau is a 63 y.o. female who presents for Diabetes.    Referring Provider: Valarie Vides MD     Patient has not gotten new glucometer yet so she has not been able to check BG readings.     Diabetes  She presents for her follow-up diabetic visit. She has type 2 diabetes mellitus.       Allergies   Allergen Reactions    Gadobutrol Itching     This is to MRI contrast. Endy Payton    Grape Other     Grapes=Dizziness.    Iodides Hives    Iodinated Contrast Media Hives    Nut - Unspecified Diarrhea, Swelling and Other     Dizziness peanuts    Other Itching    Penicillins Hives and Itching    Raspberry Itching    Strawberry Itching       Objective     Current DM Pharmacotherapy:   Trulicity 3 mg/dose; inject 3 mg under the skin weekly on Sundays -patient stopped taking due to itching and rash.  Metformin 1000 mg; take 1 tablet by mouth daily    SECONDARY PREVENTION  - Statin? Yes  - ACE-I/ARB? Yes  - Aspirin? Yes    Current monitoring regimen:   Patient has not been able to  the glucometer from [facility]. Informed patient that it was sent in and that she should be able to get it any time from [facility].    There were no vitals taken for this visit.    Pertinent PMH Review:  - PMH of Pancreatitis: No  - PMH/FH of Medullary Thyroid Cancer: No  - PMH of Retinopathy: No  - PMH of Urinary Tract Infections: patient has a history of frequent yeast infections.     Lab Review  Lab Results   Component Value Date    BILITOT 0.7 08/14/2023    CALCIUM 10.3 08/14/2023    CO2 28 08/14/2023    CL 99 08/14/2023    CREATININE 1.15 (H) 08/14/2023    GLUCOSE 350 (H) 08/14/2023    ALKPHOS 73 08/14/2023    K 4.6 08/14/2023    PROT 7.1 08/14/2023     08/14/2023    AST 34 08/14/2023    ALT 38 08/14/2023    BUN 17 08/14/2023    ANIONGAP 14 08/14/2023    ALBUMIN 4.3 08/14/2023    GFRF 53 (A) 08/14/2023     Lab Results   Component Value Date    TRIG 126 08/14/2023    CHOL 107 08/14/2023    HDL 40.5 08/14/2023      Lab Results   Component Value Date    HGBA1C 13.8 (A) 08/14/2023     The ASCVD Risk score (Jyoti MEDEL, et al., 2019) failed to calculate for the following reasons:    The patient has a prior MI or stroke diagnosis      Assessment/Plan     Problem List Items Addressed This Visit       Type 2 diabetes mellitus (CMS/[facility])     Patient's diabetes is not under control. Patient's most recent A1c from 8/14/23 was 13.8 which is above the goal os <7%.   Patient states that she has not picked up the glucometer from [facility], informed patient that she should be able to pick it up at any time.   Patient reports that she stopped the Trulicity because it caused her to to itch and break out. Willing to try Ozempic, since patient has been on Trulicity 3 mg previously, will start patient with Ozempic 0.5 mg weekly.    PLAN:  Start:  Ozempic 0.25 or 0.5 mg/dose; inject 0.5 mg under the skin weekly  Continue:  Metformin 1000 mg; take 1 tablet by mouth daily         Relevant Orders    Follow Up In [facility] - Pharmacy       Follow up: I recommend diabetes care be 3 weeks.    Shahrzad Ortiz, Pharm. D.  PGY-1 Pharmacy Resident    Continue all meds under the continuation of care with the referring provider and clinical pharmacy team

## 2023-11-13 ENCOUNTER — TELEPHONE (OUTPATIENT)
Dept: PRIMARY CARE | Facility: CLINIC | Age: 63
End: 2023-11-13
Payer: COMMERCIAL

## 2023-11-14 ENCOUNTER — OFFICE VISIT (OUTPATIENT)
Dept: PRIMARY CARE | Facility: CLINIC | Age: 63
End: 2023-11-14
Payer: COMMERCIAL

## 2023-11-14 VITALS
TEMPERATURE: 94.4 F | RESPIRATION RATE: 14 BRPM | OXYGEN SATURATION: 99 % | BODY MASS INDEX: 26.98 KG/M2 | DIASTOLIC BLOOD PRESSURE: 62 MMHG | WEIGHT: 178 LBS | HEART RATE: 105 BPM | SYSTOLIC BLOOD PRESSURE: 102 MMHG | HEIGHT: 68 IN

## 2023-11-14 DIAGNOSIS — E11.65 TYPE 2 DIABETES MELLITUS WITH HYPERGLYCEMIA, WITHOUT LONG-TERM CURRENT USE OF INSULIN (MULTI): ICD-10-CM

## 2023-11-14 DIAGNOSIS — I10 BENIGN HYPERTENSION: Primary | ICD-10-CM

## 2023-11-14 DIAGNOSIS — Z12.31 BREAST CANCER SCREENING BY MAMMOGRAM: ICD-10-CM

## 2023-11-14 DIAGNOSIS — G81.10 SPASTIC HEMIPLEGIA AFFECTING NONDOMINANT SIDE (MULTI): ICD-10-CM

## 2023-11-14 DIAGNOSIS — G50.0 TRIGEMINAL NEURALGIA: ICD-10-CM

## 2023-11-14 PROCEDURE — 99214 OFFICE O/P EST MOD 30 MIN: CPT | Performed by: STUDENT IN AN ORGANIZED HEALTH CARE EDUCATION/TRAINING PROGRAM

## 2023-11-14 PROCEDURE — 3046F HEMOGLOBIN A1C LEVEL >9.0%: CPT | Performed by: STUDENT IN AN ORGANIZED HEALTH CARE EDUCATION/TRAINING PROGRAM

## 2023-11-14 PROCEDURE — 1036F TOBACCO NON-USER: CPT | Performed by: STUDENT IN AN ORGANIZED HEALTH CARE EDUCATION/TRAINING PROGRAM

## 2023-11-14 PROCEDURE — 3074F SYST BP LT 130 MM HG: CPT | Performed by: STUDENT IN AN ORGANIZED HEALTH CARE EDUCATION/TRAINING PROGRAM

## 2023-11-14 PROCEDURE — 1160F RVW MEDS BY RX/DR IN RCRD: CPT | Performed by: STUDENT IN AN ORGANIZED HEALTH CARE EDUCATION/TRAINING PROGRAM

## 2023-11-14 PROCEDURE — 4010F ACE/ARB THERAPY RXD/TAKEN: CPT | Performed by: STUDENT IN AN ORGANIZED HEALTH CARE EDUCATION/TRAINING PROGRAM

## 2023-11-14 PROCEDURE — 3078F DIAST BP <80 MM HG: CPT | Performed by: STUDENT IN AN ORGANIZED HEALTH CARE EDUCATION/TRAINING PROGRAM

## 2023-11-14 PROCEDURE — 1159F MED LIST DOCD IN RCRD: CPT | Performed by: STUDENT IN AN ORGANIZED HEALTH CARE EDUCATION/TRAINING PROGRAM

## 2023-11-14 RX ORDER — TIZANIDINE 4 MG/1
4 TABLET ORAL EVERY 6 HOURS PRN
Qty: 30 TABLET | Refills: 2 | Status: SHIPPED | OUTPATIENT
Start: 2023-11-14 | End: 2024-02-20 | Stop reason: SDUPTHER

## 2023-11-14 RX ORDER — TIZANIDINE 4 MG/1
4 TABLET ORAL EVERY 6 HOURS PRN
Qty: 30 TABLET | Refills: 2 | Status: SHIPPED | OUTPATIENT
Start: 2023-11-14 | End: 2023-11-14 | Stop reason: SDUPTHER

## 2023-11-14 RX ORDER — SITAGLIPTIN 50 MG/1
TABLET, FILM COATED ORAL
COMMUNITY
Start: 2023-11-13 | End: 2023-12-27 | Stop reason: ALTCHOICE

## 2023-11-14 RX ORDER — FLASH GLUCOSE SCANNING READER
EACH MISCELLANEOUS
Qty: 1 EACH | Refills: 0 | Status: SHIPPED | OUTPATIENT
Start: 2023-11-14 | End: 2024-02-23

## 2023-11-14 RX ORDER — FLASH GLUCOSE SENSOR
KIT MISCELLANEOUS
Qty: 2 EACH | Refills: 11 | Status: SHIPPED | OUTPATIENT
Start: 2023-11-14 | End: 2024-02-23

## 2023-11-14 ASSESSMENT — LIFESTYLE VARIABLES
HOW OFTEN DO YOU HAVE SIX OR MORE DRINKS ON ONE OCCASION: NEVER
HOW OFTEN DO YOU HAVE A DRINK CONTAINING ALCOHOL: NEVER
AUDIT-C TOTAL SCORE: 0
HOW MANY STANDARD DRINKS CONTAINING ALCOHOL DO YOU HAVE ON A TYPICAL DAY: PATIENT DOES NOT DRINK
SKIP TO QUESTIONS 9-10: 1

## 2023-11-14 NOTE — PROGRESS NOTES
Subjective   Patient ID: Aury Lau is a pleasant 63 y.o. female with significant history of hypertension, hyperlipidemia, diabetes mellitus (HbA1c 13%), trigeminal neuralgia, depression who presents for Hypertension.  HPI    BP well controlled.  Remains compliant with her blood pressure medications  Her diabetes is not well controlled and she follows up with our clinical pharmacy team.  She was recently started on Ozempic. She has not started the medication yet.     She is also requesting something stronger for her trigeminal neuralgia.  She missed her appointment with neurology in October.  She was prescribed topiramate which she did not tolerate as she experienced dizziness and lightheadedness with the medication.  She was prescribed tizanidine. She is requesting a higher dose as she has worsening pain. Her Apt w Neurology in Jan.     Review of Systems   All other systems reviewed and are negative.      Visit Vitals  /62   Pulse 105   Temp 34.7 °C (94.4 °F)   Resp 14          Objective   Physical Exam  Constitutional:       General: She is not in acute distress.     Appearance: Normal appearance.   HENT:      Head: Normocephalic and atraumatic.   Eyes:      General: No scleral icterus.     Conjunctiva/sclera: Conjunctivae normal.   Cardiovascular:      Rate and Rhythm: Normal rate and regular rhythm.      Heart sounds: Normal heart sounds.   Pulmonary:      Effort: Pulmonary effort is normal.      Breath sounds: Normal breath sounds. No wheezing.   Abdominal:      General: Bowel sounds are normal. There is no distension.      Palpations: Abdomen is soft.      Tenderness: There is no abdominal tenderness.   Musculoskeletal:      Cervical back: Neck supple.      Right lower leg: No edema.      Left lower leg: No edema.   Lymphadenopathy:      Cervical: No cervical adenopathy.   Skin:     General: Skin is warm and dry.   Neurological:      General: No focal deficit present.      Mental Status: She is alert  and oriented to person, place, and time.   Psychiatric:         Mood and Affect: Mood normal.         Behavior: Behavior normal.         Assessment/Plan   Problem List Items Addressed This Visit       Benign hypertension - Primary     Controlled, continue with current treatment         Spastic hemiplegia affecting nondominant side (CMS/HCC)     Stable. Seeing neurology in [place]          Type 2 diabetes mellitus (CMS/HCC)     Uncontrolled, last HbA1c 13%.  Follows up with our clinical pharmacy team and was started on Ozempic.         Relevant Medications    FreeStyle Kinsey reader (FreeStyle Kinsey 2 [place]) misc    FreeStyle Kinsey sensor system (FreeStyle Kinsey 2 Sensor) kit    Other Relevant Orders    Hemoglobin A1C    Comprehensive Metabolic Panel     Other Visit Diagnoses       Trigeminal neuralgia        Relevant Medications    tiZANidine (Zanaflex) 4 mg tablet    Breast cancer screening by mammogram        Relevant Orders    BI mammo bilateral screening tomosynthesis

## 2023-11-14 NOTE — ASSESSMENT & PLAN NOTE
Uncontrolled, last HbA1c 13%.  Follows up with our clinical pharmacy team and was started on Ozempic.

## 2023-11-14 NOTE — PATIENT INSTRUCTIONS
Please schedule your mammogram.   Continue with current medications.  Blood work before your next visit.  If blood work or imaging were ordered during your visit, all the nonurgent lab results will be discussed with you at your next office visit.  Please arrive 15 minutes before your appointment.   Return to office in 3 months or as needed

## 2023-11-29 ENCOUNTER — APPOINTMENT (OUTPATIENT)
Dept: PHARMACY | Facility: HOSPITAL | Age: 63
End: 2023-11-29
Payer: COMMERCIAL

## 2023-12-06 ENCOUNTER — CLINICAL SUPPORT (OUTPATIENT)
Dept: PRIMARY CARE | Facility: CLINIC | Age: 63
End: 2023-12-06
Payer: COMMERCIAL

## 2023-12-06 DIAGNOSIS — E11.65 TYPE 2 DIABETES MELLITUS WITH HYPERGLYCEMIA, WITHOUT LONG-TERM CURRENT USE OF INSULIN (MULTI): ICD-10-CM

## 2023-12-06 NOTE — ASSESSMENT & PLAN NOTE
Assisted patient with the first Ozempic injection in office 12/6/23.    Ozempic Education:     - Counseled patient on Ozempic MOA, expectations, side effects, duration of therapy, administration, and monitoring parameters.  - Provided detailed dosing and administration counseling to ensure proper technique.   - Reviewed Ozempic titration schedule, starting with 0.25 mg once weekly for 4 weeks, then continuing on 0.5 mg once weekly. Pt verbalized understanding.  - Counseled patient on the benefits of GLP-1ra, such as cardiovascular risk reduction, glycemic control, and weight loss potential.  - Reviewed storage requirements of Ozempic when not in use, and when to administer the medication if a dose is missed.  - Advised patient that they may experience improved satiety after meals and portion sizes of meals may be reduced as doses of Ozempic increase.  - Counseled patient to avoid foods that are fatty/oily as this may precipitate the nausea/GI upset that may occur with new start Ozempic.       PLAN:  Continue:  Ozempic 0.25 mg or 0.5 mg/dose; inject 0.25 mg under the skin weekly for 4 weeks, then increase to 0.5 mg weekly.  Metformin 1000 mg; take 1 tablet by mouth daily

## 2023-12-06 NOTE — PROGRESS NOTES
Subjective   Patient ID: Aury Lau is a 63 y.o. female who presents for Diabetes.  Diabetes  She presents for her follow-up diabetic visit. She has type 2 diabetes mellitus. Her disease course has been stable.     Objective   No vitals taken for this appointment.     Assessment/Plan   Problem List Items Addressed This Visit       Type 2 diabetes mellitus (CMS/[facility])     Assisted patient with the first Ozempic injection in office 12/6/23.    Ozempic Education:     - Counseled patient on Ozempic MOA, expectations, side effects, duration of therapy, administration, and monitoring parameters.  - Provided detailed dosing and administration counseling to ensure proper technique.   - Reviewed Ozempic titration schedule, starting with 0.25 mg once weekly for 4 weeks, then continuing on 0.5 mg once weekly. Pt verbalized understanding.  - Counseled patient on the benefits of GLP-1ra, such as cardiovascular risk reduction, glycemic control, and weight loss potential.  - Reviewed storage requirements of Ozempic when not in use, and when to administer the medication if a dose is missed.  - Advised patient that they may experience improved satiety after meals and portion sizes of meals may be reduced as doses of Ozempic increase.  - Counseled patient to avoid foods that are fatty/oily as this may precipitate the nausea/GI upset that may occur with new start Ozempic.       PLAN:  Continue:  Ozempic 0.25 mg or 0.5 mg/dose; inject 0.25 mg under the skin weekly for 4 weeks, then increase to 0.5 mg weekly.  Metformin 1000 mg; take 1 tablet by mouth daily         Relevant Orders    Follow Up In Advanced [facility] - Pharmacy     Shahrzad Ortiz, Pharm. D.  PGY-1 Pharmacy Resident

## 2023-12-27 ENCOUNTER — TELEMEDICINE (OUTPATIENT)
Dept: PHARMACY | Facility: HOSPITAL | Age: 63
End: 2023-12-27
Payer: COMMERCIAL

## 2023-12-27 DIAGNOSIS — E11.65 TYPE 2 DIABETES MELLITUS WITH HYPERGLYCEMIA, WITHOUT LONG-TERM CURRENT USE OF INSULIN (MULTI): Primary | ICD-10-CM

## 2023-12-27 RX ORDER — SEMAGLUTIDE 0.68 MG/ML
0.5 INJECTION, SOLUTION SUBCUTANEOUS
Qty: 3 ML | Refills: 1 | Status: SHIPPED | OUTPATIENT
Start: 2023-12-27 | End: 2024-02-02

## 2023-12-27 RX ORDER — BLOOD SUGAR DIAGNOSTIC
STRIP MISCELLANEOUS
Qty: 100 STRIP | Refills: 3 | Status: SHIPPED | OUTPATIENT
Start: 2023-12-27 | End: 2024-02-23 | Stop reason: WASHOUT

## 2023-12-27 RX ORDER — LANCETS
EACH MISCELLANEOUS
Qty: 100 EACH | Refills: 3 | Status: SHIPPED | OUTPATIENT
Start: 2023-12-27 | End: 2024-04-04 | Stop reason: ALTCHOICE

## 2023-12-27 RX ORDER — DEXTROSE 4 G
TABLET,CHEWABLE ORAL
Qty: 1 EACH | Refills: 0 | Status: SHIPPED | OUTPATIENT
Start: 2023-12-27 | End: 2024-04-04 | Stop reason: ALTCHOICE

## 2023-12-27 NOTE — ASSESSMENT & PLAN NOTE
Patient's diabetes is not currently well controlled. Her most recent A1c from 8/14/23 was 13.8% which is above her goal A1c of <7%. Patient will do the final dose of the Ozempic 0.25 mg today and should be ready to go up to the 0.5 mg dose next week. Patient needs new prescriptions for testing supplies sent to a  pharmacy to be mailed, the old prescription was sent to a [facility] which has since closed.     PLAN:  Continue:  Metformin 1000 mg; take 1 tablet by mouth daily  Ozempic 0.25 mg or 0.5 mg/ dose; inject 0.5 mg under the skin weekly (will finish the 0.25 mg dose on 12/27).

## 2023-12-28 PROCEDURE — RXMED WILLOW AMBULATORY MEDICATION CHARGE

## 2023-12-29 ENCOUNTER — PHARMACY VISIT (OUTPATIENT)
Dept: PHARMACY | Facility: CLINIC | Age: 63
End: 2023-12-29
Payer: COMMERCIAL

## 2024-01-02 ENCOUNTER — OFFICE VISIT (OUTPATIENT)
Dept: NEUROLOGY | Facility: CLINIC | Age: 64
End: 2024-01-02
Payer: COMMERCIAL

## 2024-01-02 ENCOUNTER — PHARMACY VISIT (OUTPATIENT)
Dept: PHARMACY | Facility: CLINIC | Age: 64
End: 2024-01-02
Payer: COMMERCIAL

## 2024-01-02 VITALS
BODY MASS INDEX: 30.49 KG/M2 | DIASTOLIC BLOOD PRESSURE: 86 MMHG | WEIGHT: 183 LBS | HEART RATE: 80 BPM | HEIGHT: 65 IN | SYSTOLIC BLOOD PRESSURE: 148 MMHG

## 2024-01-02 DIAGNOSIS — Z51.81 MEDICATION MONITORING ENCOUNTER: ICD-10-CM

## 2024-01-02 DIAGNOSIS — G50.1 ATYPICAL FACIAL PAIN: ICD-10-CM

## 2024-01-02 DIAGNOSIS — G50.0 TRIGEMINAL NEURALGIA OF LEFT SIDE OF FACE: Primary | ICD-10-CM

## 2024-01-02 PROCEDURE — 1036F TOBACCO NON-USER: CPT | Performed by: STUDENT IN AN ORGANIZED HEALTH CARE EDUCATION/TRAINING PROGRAM

## 2024-01-02 PROCEDURE — 1159F MED LIST DOCD IN RCRD: CPT | Performed by: STUDENT IN AN ORGANIZED HEALTH CARE EDUCATION/TRAINING PROGRAM

## 2024-01-02 PROCEDURE — 3077F SYST BP >= 140 MM HG: CPT | Performed by: STUDENT IN AN ORGANIZED HEALTH CARE EDUCATION/TRAINING PROGRAM

## 2024-01-02 PROCEDURE — 99205 OFFICE O/P NEW HI 60 MIN: CPT | Performed by: STUDENT IN AN ORGANIZED HEALTH CARE EDUCATION/TRAINING PROGRAM

## 2024-01-02 PROCEDURE — RXMED WILLOW AMBULATORY MEDICATION CHARGE

## 2024-01-02 PROCEDURE — 4010F ACE/ARB THERAPY RXD/TAKEN: CPT | Performed by: STUDENT IN AN ORGANIZED HEALTH CARE EDUCATION/TRAINING PROGRAM

## 2024-01-02 PROCEDURE — 3079F DIAST BP 80-89 MM HG: CPT | Performed by: STUDENT IN AN ORGANIZED HEALTH CARE EDUCATION/TRAINING PROGRAM

## 2024-01-02 RX ORDER — OXCARBAZEPINE 150 MG/1
150 TABLET, FILM COATED ORAL 2 TIMES DAILY
Qty: 180 TABLET | Refills: 2 | Status: SHIPPED | OUTPATIENT
Start: 2024-01-02 | End: 2025-01-01

## 2024-01-02 NOTE — PROGRESS NOTES
Date of Service: 1/2/2024  Patient: Aury Lau  MRN: [id number]  Referring Provider: No ref. provider found  PCP: Valarie Vides MD    History of Present Illness:   Ms. Lau is a 63 y.o. female who presents to neurology clinic for evaluation of left sided facial pain/trigeminal neuralgia. Aury Lau's past medical history is pertinent for DMII, HTN, remote thalamic infarct (2012). The history is somewhat limited.    The patient reports that she started having left sided facial pain starting 2014. The pain is described as electrical shooting pain on the left side of the face primarily in V2 and V3 distribution but also into the left ear and tongue at times. Episodes can last up to 25 minutes in duration. Pain is triggered by light touch of the face, brushing her teeth, chewing food, speaking. She also has a history of low-grade constant burning pain on the left face, but this has improved recently. She denies any numbness or weakness in the face or extremities. No autonomic symptoms associated with the pain    Tried gabapentin without relief. Max dose tried unknown  Tried baclofen which initially provided relief but then stopped working  The patient not recall being on trilepal. Previous [facility] documentation reports that she was on a low dose, which provided some relief.  Lidocaine patch did not help.  She is currently taking tizanidine PRN which will help  She is on nortriptyline 50mg daily which also provides some relief.    No history of MS, Lyme's disease, facial rash. History of thalamic infarct.    Review of Systems:  The systems were reviewed with pertinent positives and negatives documented in the HPI.      Past Medical & Surgical History  No past medical history on file.  No past surgical history on file.    Social History:   Social History     Tobacco Use    Smoking status: Never    Smokeless tobacco: Never   Substance Use Topics    Alcohol use: Never     Medications:     Current Outpatient Medications:      "blood sugar diagnostic (Accu-Chek Guide test strips) strip, Use as directed to test blood sugar once daily, Disp: 100 strip, Rfl: 3    blood-glucose meter misc, Use as directed to monitor blood sugar., Disp: 1 each, Rfl: 0    FreeStyle Kinsey reader (FreeStyle Kinsey 2 [place]) misc, Use as instructed, Disp: 1 each, Rfl: 0    FreeStyle Kinsey sensor system (FreeStyle Kinsey 2 Sensor) kit, Use as instructed to test blood sugars. Change every 14 days., Disp: 2 each, Rfl: 11    lancets misc, Use as directed to monitor blood sugar once daily, Disp: 100 each, Rfl: 3    lisinopril 20 mg tablet, Take 1 tablet (20 mg) by mouth once daily., Disp: 90 tablet, Rfl: 1    metFORMIN (Glucophage) 1,000 mg tablet, Take once daily, Disp: 90 tablet, Rfl: 1    nortriptyline (Pamelor) 50 mg capsule, Take 1 capsule (50 mg) by mouth once daily., Disp: , Rfl:     rosuvastatin (Crestor) 40 mg tablet, Take 1 tablet (40 mg) by mouth once daily., Disp: 90 tablet, Rfl: 1    semaglutide (Ozempic) 0.25 mg or 0.5 mg (2 mg/3 mL) pen injector, Inject 0.5 mg under the skin 1 (one) time per week., Disp: 3 mL, Rfl: 1    tiZANidine (Zanaflex) 4 mg tablet, Take 1 tablet (4 mg) by mouth every 6 hours if needed for muscle spasms for up to 23 days. MAX dose 24 mg/per day, Disp: 30 tablet, Rfl: 2    Vitamin B-12 1,000 mcg tablet, TAKE 1 TABLET (1,000 MCG) BY MOUTH ONCE DAILY, Disp: 90 tablet, Rfl: 1    amLODIPine (Norvasc) 5 mg tablet, Take 1 tablet (5 mg) by mouth once daily., Disp: 30 tablet, Rfl: 2    OXcarbazepine (Trileptal) 150 mg tablet, Take 1 tablet (150 mg) by mouth 2 times a day., Disp: 180 tablet, Rfl: 2     General Physical Exam:  /86 (BP Location: Left arm, Patient Position: Sitting, BP Cuff Size: Adult)   Pulse 80   Ht 1.651 m (5' 5\")   Wt 83 kg (183 lb)   BMI 30.45 kg/m²      She looks well and is not in any acute distress. Breathing comfortably on room air.     Neurological Exam:   Mental status reveals her to be alert and oriented. " Speech is intact to conversation.      Cranial nerves:  CN 2   Visual fields full to confrontation.   CN 3, 4, 6   Pupils round, 3 mm in diameter, equally reactive to light. Lids symmetric; no ptosis. EOMs normal alignment, full range.   No nystagmus.   CN 5   Facial sensation intact bilaterally.   CN 7   Normal and symmetric facial strength. Nasolabial folds symmetric.   CN 8   Hearing intact to conversation.   CN 9   Palate elevates symmetrically.   CN 11   Normal strength of shoulder shrug and neck turning.   CN 12   Tongue midline, with normal bulk and strength; no fasciculations.      Motor:    R L   5 5 Shoulder abduction  5 5 Elbow flexion  5 5 Elbow extension  5 5 Finger abduction  5 5 Hip flexion  5 5 Knee flexion  5 5 Knee extension  5 5 Ankle dorsiflexion  5 5 Ankle plantarflexion     Reflexes                         R     L  Tricpes          1      1  Biceps           1     1  Brachiorad    1      1  Patellar         1     1  Achilles         1     1    Sensory:   Light Touch: normal, including over face  Pin: normal, including over face     Coordination:  In both upper extremities, finger-nose-finger was intact without dysmetria or overshoot.   In both lower extremities, heel-to-shin was intact.      Gait:  Station was stable with a normal base. Gait was stable with a normal arm swing and speed.     Results:    Lab Results   Component Value Date    HGBA1C 13.8 (A) 08/14/2023     CBC:   Lab Results   Component Value Date    WBC 11.0 08/14/2023    HGB 13.2 08/14/2023    HCT 41.8 08/14/2023     08/14/2023     BMP:   Lab Results   Component Value Date     08/14/2023    K 4.6 08/14/2023    CL 99 08/14/2023    CO2 28 08/14/2023    BUN 17 08/14/2023    CREATININE 1.15 (H) 08/14/2023    CALCIUM 10.3 08/14/2023     LFT:   Lab Results   Component Value Date    ALKPHOS 73 08/14/2023    BILITOT 0.7 08/14/2023    PROT 7.1 08/14/2023    ALBUMIN 4.3 08/14/2023    ALT 38 08/14/2023    AST 34 08/14/2023      Imaging:  MRI Brain/MRA with and without (2019 at [facility])    IMPRESSION:     REMOTE ISCHEMIC CHANGES, AND MICROVASCULAR ISCHEMIC CHANGES WITHOUT ANY   EVIDENCE OF AN ACUTE ABNORMALITY.     CLOSE APPROXIMATION OF THE SUPERIOR CEREBELLAR ARTERIES BILATERALLY WITH   THE CISTERNAL SEGMENTS OF THE TRIGEMINAL NERVES, OF UNCLEAR CLINICAL   SIGNIFICANCE.     Impression:  Aury Lau is a 63 y.o. who presents with left sided atypical facial pain that has features of trigeminal neuropathy. 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     She was previously evaluate by neurology at [facility] since 2018 and underwent MRI brain and MRA imaging which did not show any acute abnormalities in the brain at that time. MRA head showed close approximation of the SCA bilaterally with the cisternal segments of the trigeminal nerves. She was previously treated with oxcarbazepine and had some relief. It is reasonable to trial this medication again. Lamotrigine can be considered in the future. She can continue tizanidine as it has been helpful.    Plan:  - Start oxcarbazepine 150mg BID. If no significant improvement in a week can increase to 150mg-300mg. Then 300mg-300mg after another week.   - Continue tizanidine PRN  - BMP after being on oxcarbazepine for 1 month.    The patient reports that she is unable to follow up in [place] due to the distance from her home. I have referred her to a colleague at  with clinic in [place].     Reviewed and approved by ALBINA COX on 1/2/24 at 9:59 AM.    I personally spent 65 minutes on the day of the visit completing the review of the medical record and outside records, obtaining history and performing an appropriate physical exam, patient care, counseling and education, placing orders, independently reviewing results, communicating with the patient/family and other providers, coordinating care and  performing appropriate clinical documentation.

## 2024-01-02 NOTE — PATIENT INSTRUCTIONS
You have facial pain on the left side with some characteristics of trigeminal neuralgia. In the past a medicine called oxcarbazepine has been helpful and I would recommend restarting it.    Start oxcarbazepine 1 pill (150mg) twice daily.  If after a week there is no significant improvement you can increase to 1 pill in the morning and 2 pills at night.  If after another week you continue to have many episodes of pain, you can increase to 2 pills twice a day.    After one month of being on the medicine you should get blood work to check your sodium level.    You should continue your tizanidine as needed.    As [place] is a far drive for you, I have referred you to a colleague that sees patients in [place].    Please call with questions.

## 2024-01-03 DIAGNOSIS — E11.65 TYPE 2 DIABETES MELLITUS WITH HYPERGLYCEMIA, WITHOUT LONG-TERM CURRENT USE OF INSULIN (MULTI): ICD-10-CM

## 2024-01-03 RX ORDER — SEMAGLUTIDE 0.68 MG/ML
0.5 INJECTION, SOLUTION SUBCUTANEOUS
Refills: 1 | OUTPATIENT
Start: 2024-01-03

## 2024-01-17 ENCOUNTER — APPOINTMENT (OUTPATIENT)
Dept: PHARMACY | Facility: HOSPITAL | Age: 64
End: 2024-01-17
Payer: COMMERCIAL

## 2024-01-26 ENCOUNTER — TELEMEDICINE (OUTPATIENT)
Dept: PHARMACY | Facility: HOSPITAL | Age: 64
End: 2024-01-26
Payer: COMMERCIAL

## 2024-01-26 DIAGNOSIS — E11.65 TYPE 2 DIABETES MELLITUS WITH HYPERGLYCEMIA, WITHOUT LONG-TERM CURRENT USE OF INSULIN (MULTI): ICD-10-CM

## 2024-01-26 PROCEDURE — RXMED WILLOW AMBULATORY MEDICATION CHARGE

## 2024-01-26 NOTE — PROGRESS NOTES
Subjective     Patient ID: Aury Lau is a 63 y.o. female who presents for Diabetes.    Referring Provider: Valarie Vides MD     Diabetes  She presents for her follow-up diabetic visit. She has type 2 diabetes mellitus. Symptoms are stable.       Patient has concerns about Ozempic administration. States she sees a lot of leakage. Also has questions regarding testing supplies. No Adverse effects noted with Ozempic but patient is concerned she is not getting doses.     Allergies   Allergen Reactions    Gadobutrol Itching     This is to MRI contrast. Endy Payton    Grape Other     Grapes=Dizziness.    Iodides Hives    Iodinated Contrast Media Hives    Nut - Unspecified Diarrhea, Swelling and Other     Dizziness peanuts    Other Itching    Penicillins Hives and Itching    Raspberry Itching    Strawberry Itching       Objective     Current DM Pharmacotherapy:   Metformin 1000 mg; take 1 tablet by mouth daily  Ozempic 0.25 mg or 0.5 mg/dose; inject 0.25 mg under the skin for 4 weeks, then increase to 0.5 mg weekly    SECONDARY PREVENTION  - Statin? Yes  - ACE-I/ARB? Yes  - Aspirin? Yes    Current monitoring regimen:   Patient is using: patient needs a new BG monitor/ strips/ lancets sent to a  pharmacy, old prescriptions sent in to a [facility] which is now closed.     Pertinent PMH Review:  - PMH of Pancreatitis: No  - PMH/FH of Medullary Thyroid Cancer: No  - PMH of Retinopathy: No  - PMH of Urinary Tract Infections: patient has a history of frequent yeast infections    Lab Review  Lab Results   Component Value Date    BILITOT 0.7 08/14/2023    CALCIUM 10.3 08/14/2023    CO2 28 08/14/2023    CL 99 08/14/2023    CREATININE 1.15 (H) 08/14/2023    GLUCOSE 350 (H) 08/14/2023    ALKPHOS 73 08/14/2023    K 4.6 08/14/2023    PROT 7.1 08/14/2023     08/14/2023    AST 34 08/14/2023    ALT 38 08/14/2023    BUN 17 08/14/2023    ANIONGAP 14 08/14/2023    ALBUMIN 4.3 08/14/2023    GFRF 53 (A) 08/14/2023     Lab Results   Component  Value Date    TRIG 126 08/14/2023    CHOL 107 08/14/2023    HDL 40.5 08/14/2023     Lab Results   Component Value Date    HGBA1C 13.8 (A) 08/14/2023     The ASCVD Risk score (Jyoti MEDEL, et al., 2019) failed to calculate for the following reasons:    The patient has a prior MI or stroke diagnosis      Assessment/Plan     Problem List Items Addressed This Visit       Type 2 diabetes mellitus (CMS/[facility])     In person appointment to go over injection technique and testing. Continue current doses.             Type 2 diabetes mellitus, is not at goal. Goal A1C: <7%    Follow up: 1 week in person with resident     Destinee LevineD [facility]  Clinical Pharmacy Specialist, Primary Care       Continue all meds under the continuation of care with the referring provider and clinical pharmacy team

## 2024-01-29 ENCOUNTER — PHARMACY VISIT (OUTPATIENT)
Dept: PHARMACY | Facility: CLINIC | Age: 64
End: 2024-01-29
Payer: COMMERCIAL

## 2024-02-01 ENCOUNTER — CLINICAL SUPPORT (OUTPATIENT)
Dept: PRIMARY CARE | Facility: CLINIC | Age: 64
End: 2024-02-01
Payer: COMMERCIAL

## 2024-02-01 DIAGNOSIS — E11.65 TYPE 2 DIABETES MELLITUS WITH HYPERGLYCEMIA, WITHOUT LONG-TERM CURRENT USE OF INSULIN (MULTI): ICD-10-CM

## 2024-02-01 DIAGNOSIS — E78.2 MIXED HYPERLIPIDEMIA: ICD-10-CM

## 2024-02-01 DIAGNOSIS — I10 ESSENTIAL HYPERTENSION: ICD-10-CM

## 2024-02-01 RX ORDER — BLOOD-GLUCOSE CONTROL, NORMAL
1 EACH MISCELLANEOUS DAILY
Qty: 100 EACH | Refills: 3 | Status: SHIPPED | OUTPATIENT
Start: 2024-02-01 | End: 2024-04-04 | Stop reason: ALTCHOICE

## 2024-02-01 RX ORDER — BLOOD-GLUCOSE METER
1 EACH MISCELLANEOUS DAILY
Qty: 100 EACH | Refills: 3 | Status: SHIPPED | OUTPATIENT
Start: 2024-02-01 | End: 2024-02-23 | Stop reason: SDUPTHER

## 2024-02-01 RX ORDER — ISOPROPYL ALCOHOL 70 ML/100ML
1 SWAB TOPICAL DAILY PRN
Qty: 100 EACH | Refills: 0 | Status: SHIPPED | OUTPATIENT
Start: 2024-02-01

## 2024-02-01 NOTE — PROGRESS NOTES
Subjective   Patient ID: Aury Lau is a 64 y.o. female who presents for Diabetes    Patient came to review injection technique for her Ozempic.     Diabetes  She presents for her follow-up diabetic visit. She has type 2 diabetes mellitus. Symptoms are stable. She is compliant with treatment all of the time.     Objective   No vitals taken for this appointment.     Assessment/Plan   Problem List Items Addressed This Visit       Type 2 diabetes mellitus (CMS/[facility])     Patient was seen in the office today to review injection technique for her Ozempic.  Ozempic Education:   - Provided detailed dosing and administration counseling to ensure proper technique.   - Answered any other questions that the patient had about the Ozempic administration or dosing.              Relevant Medications    blood sugar diagnostic (OneTouch Verio test strips) strip    lancets 30 gauge misc    alcohol swabs (Alcohol Pads) pads, medicated    Other Relevant Orders    Follow Up In Advanced Primary Care - Pharmacy     Recommend follow-up in 3 weeks.     Shahrzad Ortiz, Pharm. D.  PGY-1 Pharmacy Resident

## 2024-02-01 NOTE — ASSESSMENT & PLAN NOTE
Patient was seen in the office today to review injection technique for her Ozempic.  Ozempic Education:   - Provided detailed dosing and administration counseling to ensure proper technique.   - Answered any other questions that the patient had about the Ozempic administration or dosing.

## 2024-02-02 RX ORDER — METFORMIN HYDROCHLORIDE 1000 MG/1
TABLET ORAL
Qty: 90 TABLET | Refills: 1 | Status: SHIPPED | OUTPATIENT
Start: 2024-02-02 | End: 2024-02-22 | Stop reason: DRUGHIGH

## 2024-02-02 RX ORDER — ROSUVASTATIN CALCIUM 40 MG/1
40 TABLET, COATED ORAL DAILY
Qty: 90 TABLET | Refills: 1 | Status: SHIPPED | OUTPATIENT
Start: 2024-02-02

## 2024-02-02 RX ORDER — LISINOPRIL 20 MG/1
20 TABLET ORAL DAILY
Qty: 90 TABLET | Refills: 1 | Status: SHIPPED | OUTPATIENT
Start: 2024-02-02

## 2024-02-02 RX ORDER — SEMAGLUTIDE 0.68 MG/ML
0.5 INJECTION, SOLUTION SUBCUTANEOUS
Qty: 3 ML | Refills: 1 | Status: SHIPPED | OUTPATIENT
Start: 2024-02-02 | End: 2024-02-22 | Stop reason: DRUGHIGH

## 2024-02-20 ENCOUNTER — OFFICE VISIT (OUTPATIENT)
Dept: PRIMARY CARE | Facility: CLINIC | Age: 64
End: 2024-02-20
Payer: COMMERCIAL

## 2024-02-20 ENCOUNTER — LAB (OUTPATIENT)
Dept: LAB | Facility: LAB | Age: 64
End: 2024-02-20
Payer: COMMERCIAL

## 2024-02-20 VITALS
SYSTOLIC BLOOD PRESSURE: 102 MMHG | HEIGHT: 65 IN | HEART RATE: 97 BPM | BODY MASS INDEX: 29.91 KG/M2 | DIASTOLIC BLOOD PRESSURE: 64 MMHG | RESPIRATION RATE: 14 BRPM | TEMPERATURE: 97.4 F | OXYGEN SATURATION: 97 % | WEIGHT: 179.5 LBS

## 2024-02-20 DIAGNOSIS — F32.1 MAJOR DEPRESSIVE DISORDER, SINGLE EPISODE, MODERATE (MULTI): ICD-10-CM

## 2024-02-20 DIAGNOSIS — B37.31 YEAST VAGINITIS: ICD-10-CM

## 2024-02-20 DIAGNOSIS — E11.65 TYPE 2 DIABETES MELLITUS WITH HYPERGLYCEMIA, WITHOUT LONG-TERM CURRENT USE OF INSULIN (MULTI): ICD-10-CM

## 2024-02-20 DIAGNOSIS — G81.10 SPASTIC HEMIPLEGIA AFFECTING NONDOMINANT SIDE (MULTI): ICD-10-CM

## 2024-02-20 DIAGNOSIS — G50.0 TRIGEMINAL NEURALGIA: ICD-10-CM

## 2024-02-20 DIAGNOSIS — E11.69 TYPE 2 DIABETES MELLITUS WITH OTHER SPECIFIED COMPLICATION, UNSPECIFIED WHETHER LONG TERM INSULIN USE (MULTI): Primary | ICD-10-CM

## 2024-02-20 LAB
APPEARANCE UR: CLEAR
BILIRUB UR STRIP.AUTO-MCNC: NEGATIVE MG/DL
COLOR UR: ABNORMAL
CREAT UR-MCNC: 67.3 MG/DL (ref 20–320)
GLUCOSE UR STRIP.AUTO-MCNC: ABNORMAL MG/DL
KETONES UR STRIP.AUTO-MCNC: NEGATIVE MG/DL
LEUKOCYTE ESTERASE UR QL STRIP.AUTO: NEGATIVE
MICROALBUMIN UR-MCNC: 25.5 MG/L
MICROALBUMIN/CREAT UR: 37.9 UG/MG CREAT
MUCOUS THREADS #/AREA URNS AUTO: NORMAL /LPF
NITRITE UR QL STRIP.AUTO: NEGATIVE
PH UR STRIP.AUTO: 5 [PH]
PROT UR STRIP.AUTO-MCNC: ABNORMAL MG/DL
RBC # UR STRIP.AUTO: NEGATIVE /UL
RBC #/AREA URNS AUTO: NORMAL /HPF
SP GR UR STRIP.AUTO: 1.03
SQUAMOUS #/AREA URNS AUTO: NORMAL /HPF
UROBILINOGEN UR STRIP.AUTO-MCNC: NORMAL MG/DL
WBC #/AREA URNS AUTO: NORMAL /HPF

## 2024-02-20 PROCEDURE — 3074F SYST BP LT 130 MM HG: CPT | Performed by: STUDENT IN AN ORGANIZED HEALTH CARE EDUCATION/TRAINING PROGRAM

## 2024-02-20 PROCEDURE — 3078F DIAST BP <80 MM HG: CPT | Performed by: STUDENT IN AN ORGANIZED HEALTH CARE EDUCATION/TRAINING PROGRAM

## 2024-02-20 PROCEDURE — 1159F MED LIST DOCD IN RCRD: CPT | Performed by: STUDENT IN AN ORGANIZED HEALTH CARE EDUCATION/TRAINING PROGRAM

## 2024-02-20 PROCEDURE — 82043 UR ALBUMIN QUANTITATIVE: CPT

## 2024-02-20 PROCEDURE — 80053 COMPREHEN METABOLIC PANEL: CPT

## 2024-02-20 PROCEDURE — 1036F TOBACCO NON-USER: CPT | Performed by: STUDENT IN AN ORGANIZED HEALTH CARE EDUCATION/TRAINING PROGRAM

## 2024-02-20 PROCEDURE — 36415 COLL VENOUS BLD VENIPUNCTURE: CPT

## 2024-02-20 PROCEDURE — 4010F ACE/ARB THERAPY RXD/TAKEN: CPT | Performed by: STUDENT IN AN ORGANIZED HEALTH CARE EDUCATION/TRAINING PROGRAM

## 2024-02-20 PROCEDURE — 83036 HEMOGLOBIN GLYCOSYLATED A1C: CPT

## 2024-02-20 PROCEDURE — 81001 URINALYSIS AUTO W/SCOPE: CPT

## 2024-02-20 PROCEDURE — 1160F RVW MEDS BY RX/DR IN RCRD: CPT | Performed by: STUDENT IN AN ORGANIZED HEALTH CARE EDUCATION/TRAINING PROGRAM

## 2024-02-20 PROCEDURE — 99214 OFFICE O/P EST MOD 30 MIN: CPT | Performed by: STUDENT IN AN ORGANIZED HEALTH CARE EDUCATION/TRAINING PROGRAM

## 2024-02-20 PROCEDURE — 82570 ASSAY OF URINE CREATININE: CPT

## 2024-02-20 RX ORDER — TIZANIDINE 4 MG/1
4 TABLET ORAL EVERY 6 HOURS PRN
Qty: 30 TABLET | Refills: 2 | Status: SHIPPED | OUTPATIENT
Start: 2024-02-20 | End: 2024-03-14

## 2024-02-20 RX ORDER — FLUCONAZOLE 150 MG/1
150 TABLET ORAL ONCE
Qty: 2 TABLET | Refills: 0 | Status: SHIPPED | OUTPATIENT
Start: 2024-02-20 | End: 2024-02-23 | Stop reason: WASHOUT

## 2024-02-20 ASSESSMENT — PATIENT HEALTH QUESTIONNAIRE - PHQ9
10. IF YOU CHECKED OFF ANY PROBLEMS, HOW DIFFICULT HAVE THESE PROBLEMS MADE IT FOR YOU TO DO YOUR WORK, TAKE CARE OF THINGS AT HOME, OR GET ALONG WITH OTHER PEOPLE: SOMEWHAT DIFFICULT
1. LITTLE INTEREST OR PLEASURE IN DOING THINGS: NOT AT ALL
2. FEELING DOWN, DEPRESSED OR HOPELESS: SEVERAL DAYS
SUM OF ALL RESPONSES TO PHQ9 QUESTIONS 1 & 2: 1

## 2024-02-20 ASSESSMENT — LIFESTYLE VARIABLES
SKIP TO QUESTIONS 9-10: 1
HOW OFTEN DO YOU HAVE A DRINK CONTAINING ALCOHOL: NEVER
AUDIT-C TOTAL SCORE: 0
HOW MANY STANDARD DRINKS CONTAINING ALCOHOL DO YOU HAVE ON A TYPICAL DAY: PATIENT DOES NOT DRINK
HOW OFTEN DO YOU HAVE SIX OR MORE DRINKS ON ONE OCCASION: NEVER

## 2024-02-20 NOTE — PROGRESS NOTES
Subjective   Patient ID: Aury Lau is a pleasant 64 y.o. female with significant history of hypertension, diabetes (HbA1c 13.8), trigeminal neuralgia who presents for Follow-up (Trigeminal neuralgia).  HPI  She is here for follow-up and medication refills.  She has been following up with our clinical pharmacy team for her diabetes.  Her last HbA1c was 13.8% in August 2023.  She is taking Ozempic and metformin.  Blood pressure is well-controlled.    She is requesting a letter to be written to [facility].  She has been experiencing severe symptoms associated with her trigeminal neuralgia.  Her speaking has been affected and and movement on the left side face is affected by trigeminal neuralgia. 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  The letter will be mailed to her after completion     She also reports she has not noted vaginal itching.  This was previously treated with Diflucan in September 2023.  Initially resolved the symptoms however reports that she continues to have recurrent vaginal itching.  Denies any discharge, hematuria or pyuria.  I discussed with patient that given her uncontrolled diabetes it is likely for her to be more prone to infections.  We discussed about treating with Diflucan but if her symptoms do not improve she will see GYN for further evaluation.    Review of Systems   All other systems reviewed and are negative.      Visit Vitals  /64 (BP Location: Right arm, Patient Position: Sitting, BP Cuff Size: Adult)   Pulse 97   Temp 36.3 °C (97.4 °F)   Resp 14          Objective   Physical Exam  Constitutional:       General: She is not in acute distress.     Appearance: Normal appearance.   HENT:      Head: Normocephalic and atraumatic.   Eyes:      General: No scleral icterus.     Conjunctiva/sclera: Conjunctivae normal.   Cardiovascular:       Rate and Rhythm: Normal rate and regular rhythm.      Heart sounds: Normal heart sounds.   Pulmonary:      Effort: Pulmonary effort is normal.      Breath sounds: Normal breath sounds. No wheezing.   Abdominal:      General: Bowel sounds are normal. There is no distension.      Palpations: Abdomen is soft.      Tenderness: There is no abdominal tenderness.   Musculoskeletal:      Cervical back: Neck supple.      Right lower leg: No edema.      Left lower leg: No edema.   Lymphadenopathy:      Cervical: No cervical adenopathy.   Skin:     General: Skin is warm and dry.   Neurological:      General: No focal deficit present.      Mental Status: She is alert and oriented to person, place, and time.   Psychiatric:         Mood and Affect: Mood normal.         Behavior: Behavior normal.         Assessment/Plan   Problem List Items Addressed This Visit       Major depressive disorder, single episode, moderate (CMS/HCC)     Refer to psychiatry         Spastic hemiplegia affecting nondominant side (CMS/HCC)     Follows up with neurology         Type 2 diabetes mellitus with hyperglycemia, without long-term current use of insulin (CMS/[facility])     Follows up with clinical pharmacy team.         Type 2 diabetes mellitus with other specified complication, unspecified whether long term insulin use (CMS/[facility]) - Primary     Last HbA1c not at goal, repeat blood work ordered          Other Visit Diagnoses       Trigeminal neuralgia        Relevant Medications    tiZANidine (Zanaflex) 4 mg tablet    Yeast vaginitis        Relevant Medications    fluconazole (Diflucan) 150 mg tablet    Other Relevant Orders    Urinalysis with Reflex Microscopic    Albumin , Urine Random

## 2024-02-20 NOTE — LETTER
To whom it may concern Re: Aury Lau     I am writing to provide information on behalf of Aury Lau.  I would like to confirm that Aury Lau has been diagnosed with trigeminal neuralgia.  Her current condition significantly affects her daily function due to significant pain and shocking sensation on the left side of her face.  The pain is often exacerbated by speaking, chewing and movement of her facial muscles on the left side.    Given the nature and severity of her health condition it is essential for Aury to receive medical and dental services for her wellbeing.  I kindly request that you consider her reenrollment into [facility] dental school clinic.       Please call the office at the above number if you have any other questions or concerns.      Sincerely,    Valarie Vides M.D.

## 2024-02-20 NOTE — PATIENT INSTRUCTIONS
The Letter will be mailed to you after completion   Fluconazole ordered  Please schedule visit with GYN.   Continue with current medications.  Blood work before your next visit.  If you receive medical information from My Chart, your results will be released into your online chart. This means you may view or see results before someone from our office contact you directly.  Please keep in mind that if blood work or imaging were ordered during your visit, all the nonurgent lab results will be discussed with you at your next office visit.  Please arrive 15 minutes before your appointment.   Follow-up with primary care in 4 months or as needed

## 2024-02-21 LAB
ALBUMIN SERPL BCP-MCNC: 4 G/DL (ref 3.4–5)
ALP SERPL-CCNC: 104 U/L (ref 33–136)
ALT SERPL W P-5'-P-CCNC: 19 U/L (ref 7–45)
ANION GAP SERPL CALC-SCNC: 14 MMOL/L (ref 10–20)
AST SERPL W P-5'-P-CCNC: 18 U/L (ref 9–39)
BILIRUB SERPL-MCNC: 0.6 MG/DL (ref 0–1.2)
BUN SERPL-MCNC: 14 MG/DL (ref 6–23)
CALCIUM SERPL-MCNC: 9.6 MG/DL (ref 8.6–10.6)
CHLORIDE SERPL-SCNC: 102 MMOL/L (ref 98–107)
CO2 SERPL-SCNC: 26 MMOL/L (ref 21–32)
CREAT SERPL-MCNC: 0.92 MG/DL (ref 0.5–1.05)
EGFRCR SERPLBLD CKD-EPI 2021: 69 ML/MIN/1.73M*2
EST. AVERAGE GLUCOSE BLD GHB EST-MCNC: 344 MG/DL
GLUCOSE SERPL-MCNC: 361 MG/DL (ref 74–99)
HBA1C MFR BLD: 13.6 %
POTASSIUM SERPL-SCNC: 4.3 MMOL/L (ref 3.5–5.3)
PROT SERPL-MCNC: 6.6 G/DL (ref 6.4–8.2)
SODIUM SERPL-SCNC: 138 MMOL/L (ref 136–145)

## 2024-02-22 ENCOUNTER — TELEMEDICINE (OUTPATIENT)
Dept: PHARMACY | Facility: HOSPITAL | Age: 64
End: 2024-02-22
Payer: COMMERCIAL

## 2024-02-22 DIAGNOSIS — E11.65 TYPE 2 DIABETES MELLITUS WITH HYPERGLYCEMIA, WITHOUT LONG-TERM CURRENT USE OF INSULIN (MULTI): ICD-10-CM

## 2024-02-22 RX ORDER — METFORMIN HYDROCHLORIDE 1000 MG/1
1000 TABLET ORAL
Qty: 60 TABLET | Refills: 11 | Status: SHIPPED | OUTPATIENT
Start: 2024-02-22 | End: 2025-02-21

## 2024-02-22 NOTE — PROGRESS NOTES
Subjective     Patient ID: Aury Lau is a 64 y.o. female who presents for Diabetes.    Referring Provider: Valarie Vides MD     Diabetes  She presents for her follow-up diabetic visit. She has type 2 diabetes mellitus. Current diabetic treatment includes oral agent (monotherapy). She is compliant with treatment all of the time.       Allergies   Allergen Reactions    Gadobutrol Itching     This is to MRI contrast. Endy Payton    Grape Other     Grapes=Dizziness.    Iodides Hives    Iodinated Contrast Media Hives    Nut - Unspecified Diarrhea, Swelling and Other     Dizziness peanuts    Other Itching    Penicillins Hives and Itching    Raspberry Itching    Strawberry Itching       Objective     Current DM Pharmacotherapy:   Metformin 1000 mg; take 1 tablet by mouth daily  Ozempic 0.25 mg or 0.5 mg/dose; inject 0.5 mg under the skin once weekly    SECONDARY PREVENTION  - Statin? Yes  - ACE-I/ARB? Yes  - Aspirin? Yes    Current monitoring regimen:   Patient is using: glucometer - patient has the meter at home but does not have any test strips and is not able to fill the prescription until 3/5/24, so she is not able to test her BG at all at this time.     Pertinent PMH Review:  - PMH of Pancreatitis: No  - PMH/FH of Medullary Thyroid Cancer: No  - PMH of Retinopathy: No  - PMH of Urinary Tract Infections: Patient has a history of yeast infections    Lab Review  Lab Results   Component Value Date    BILITOT 0.6 02/20/2024    CALCIUM 9.6 02/20/2024    CO2 26 02/20/2024     02/20/2024    CREATININE 0.92 02/20/2024    GLUCOSE 361 (H) 02/20/2024    ALKPHOS 104 02/20/2024    K 4.3 02/20/2024    PROT 6.6 02/20/2024     02/20/2024    AST 18 02/20/2024    ALT 19 02/20/2024    BUN 14 02/20/2024    ANIONGAP 14 02/20/2024    ALBUMIN 4.0 02/20/2024    GFRF 53 (A) 08/14/2023     Lab Results   Component Value Date    TRIG 126 08/14/2023    CHOL 107 08/14/2023    HDL 40.5 08/14/2023     Lab Results   Component Value Date     HGBA1C 13.6 (H) 02/20/2024     The ASCVD Risk score (Jyoti MEDEL, et al., 2019) failed to calculate for the following reasons:    The patient has a prior MI or stroke diagnosis      Assessment/Plan     Problem List Items Addressed This Visit       Type 2 diabetes mellitus with hyperglycemia, without long-term current use of insulin (CMS/[facility])     Patient's diabetes is not currently at goal. Her most recent A1c from 2/20/2024 was 13.6% which is above the goal A1c of <7%.   Patient is currently taking Ozempic 0.5 mg weekly. According to patient she still has a couple of weeks left until she is done with her supply of the 0.5 mg dose. When patient is finished with current supply of Ozempic 0.5 mg/dose, recommend increasing to 1 mg/ dose. Patient is also currently taking metformin 1000 mg once a day. She can increase to twice a day to help with her glycemic control.   Patient is not currently able to test her BG levels because according to patient it is too soon to get the test strips filled by [facility] until approx. 3/5. Informed patient that the strips are available in smaller boxes with only 25 strips that she can buy over the counter without insurance if she wanted to start testing immediately,at this time patient will wait until she can get it filled through insurance. Patient's A1c is elevated and she may benefit from insulin. Do not recommend starting patient on insulin therapy without being able to measure BG at least once a day. Patient is not willing to talk about insulin today. Patient is not a candidate for a SGLT-2i with her recent history of frequent yeast infections.    PLAN:  Increase metformin to 1000 mg BID with morning and evening meals  Finish up supply of Ozempic 0.5 mg/dose before increasing up to 1 mg/dose.   After 3/5,  testing supplies from [facility] and test BG daily.            Type 2 diabetes mellitus, is not at goal. Goal A1C: <7%    Follow up: I recommend diabetes care be 2 weeks when she has  been able to get the test strips filled at [facility] with her insurance.    Shahrzad Ortiz, Pharm. D.  PGY-1 Pharmacy Resident    Continue all meds under the continuation of care with the referring provider and clinical pharmacy team

## 2024-02-22 NOTE — ASSESSMENT & PLAN NOTE
Patient's diabetes is not currently at goal. Her most recent A1c from 2/20/2024 was 13.6% which is above the goal A1c of <7%.   Patient is currently taking Ozempic 0.5 mg weekly. According to patient she still has a couple of weeks left until she is done with her supply of the 0.5 mg dose. When patient is finished with current supply of Ozempic 0.5 mg/dose, recommend increasing to 1 mg/ dose. Patient is also currently taking metformin 1000 mg once a day. She can increase to twice a day to help with her glycemic control.   Patient is not currently able to test her BG levels because according to patient it is too soon to get the test strips filled by [facility] until approx. 3/5. Informed patient that the strips are available in smaller boxes with only 25 strips that she can buy over the counter without insurance if she wanted to start testing immediately,at this time patient will wait until she can get it filled through insurance. Patient's A1c is elevated and she may benefit from insulin. Do not recommend starting patient on insulin therapy without being able to measure BG at least once a day. Patient is not willing to talk about insulin today. Patient is not a candidate for a SGLT-2i with her recent history of frequent yeast infections.    PLAN:  Increase metformin to 1000 mg BID with morning and evening meals  Finish up supply of Ozempic 0.5 mg/dose before increasing up to 1 mg/dose.   After 3/5,  testing supplies from [facility] and test BG daily.

## 2024-02-23 RX ORDER — BLOOD-GLUCOSE METER
EACH MISCELLANEOUS
Qty: 100 EACH | Refills: 3 | Status: SHIPPED | OUTPATIENT
Start: 2024-02-23 | End: 2024-04-04 | Stop reason: ALTCHOICE

## 2024-04-04 ENCOUNTER — TELEMEDICINE (OUTPATIENT)
Dept: PHARMACY | Facility: HOSPITAL | Age: 64
End: 2024-04-04
Payer: COMMERCIAL

## 2024-04-04 DIAGNOSIS — E11.65 TYPE 2 DIABETES MELLITUS WITH HYPERGLYCEMIA, WITHOUT LONG-TERM CURRENT USE OF INSULIN (MULTI): ICD-10-CM

## 2024-04-04 RX ORDER — BLOOD SUGAR DIAGNOSTIC
STRIP MISCELLANEOUS
Qty: 100 STRIP | Refills: 3 | Status: SHIPPED | OUTPATIENT
Start: 2024-04-04

## 2024-04-04 RX ORDER — LANCETS
EACH MISCELLANEOUS
Qty: 100 EACH | Refills: 3 | Status: SHIPPED | OUTPATIENT
Start: 2024-04-04

## 2024-04-04 RX ORDER — DEXTROSE 4 G
TABLET,CHEWABLE ORAL
Qty: 1 EACH | Refills: 0 | Status: SHIPPED | OUTPATIENT
Start: 2024-04-04

## 2024-04-04 NOTE — ASSESSMENT & PLAN NOTE
Patient's diabetes is not currently at goal. Her most recent A1c from 2/20/2024 was 13.6 which is above the goal A1c of <7%.    Patient states that she has not been able to  the testing supplies from [facility]. She states that they told her there was some type of delay with getting them, so she has not been testing her BG regularly. Will send new prescriptions for  home delivery to mail out to her.     Patient did not have much time to talk during this appointment. In future, can discuss a SGLT2.     PLAN:  Continue  Metformin 1000 mg; take 1 tablet by mouth twice a day  Ozempic 1 mg/dose; inject 1 mg under the skin weekly  Begin testing BG when new supplies are delivered.

## 2024-04-04 NOTE — PROGRESS NOTES
Subjective     Patient ID: Aury Lau is a 64 y.o. female who presents for Diabetes.    Referring Provider: Valarie Vides MD     Diabetes  She presents for her follow-up diabetic visit. She has type 2 diabetes mellitus. Current diabetic treatment includes oral agent (monotherapy). She is compliant with treatment all of the time. She is following a generally unhealthy diet.       Diet: Patient has been eating a lot of potatoes, rice, [place], vegetables.     Exercise: Patient does not get much exercise.    Allergies   Allergen Reactions    Gadobutrol Itching     This is to MRI contrast. Endy Payton    Grape Other     Grapes=Dizziness.    Iodides Hives    Iodinated Contrast Media Hives    Nut - Unspecified Diarrhea, Swelling and Other     Dizziness peanuts    Other Itching    Penicillins Hives and Itching    Raspberry Itching    Strawberry Itching       Objective     Current DM Pharmacotherapy:   Metformin 1000 mg; take 1 tablet by mouth twice a day with meals  Ozempic 1 mg/dose; inject 1 mg under skin weekly    SECONDARY PREVENTION  - Statin? Yes  - ACE-I/ARB? Yes  - Aspirin? No    Current monitoring regimen:   Patient has still not been able to  her testing supplies from [employer], so has not been testing her BG. Will send supplies to home delivery so patient will have new supplies.       Pertinent PMH Review:  - PMH of Pancreatitis: No  - PMH/FH of Medullary Thyroid Cancer: No  - PMH/FH of Multiple Endocrine Neoplasia (MEN) Type II: No  - PMH of Retinopathy: No  - PMH of Urinary Tract Infections: Patient has a history of yeast infections.     Lab Review  Lab Results   Component Value Date    BILITOT 0.6 02/20/2024    CALCIUM 9.6 02/20/2024    CO2 26 02/20/2024     02/20/2024    CREATININE 0.92 02/20/2024    GLUCOSE 361 (H) 02/20/2024    ALKPHOS 104 02/20/2024    K 4.3 02/20/2024    PROT 6.6 02/20/2024     02/20/2024    AST 18 02/20/2024    ALT 19 02/20/2024    BUN 14 02/20/2024    ANIONGAP 14  02/20/2024    ALBUMIN 4.0 02/20/2024    GFRF 53 (A) 08/14/2023     Lab Results   Component Value Date    TRIG 126 08/14/2023    CHOL 107 08/14/2023    HDL 40.5 08/14/2023     Lab Results   Component Value Date    HGBA1C 13.6 (H) 02/20/2024     The ASCVD Risk score (Jyoti MEDEL, et al., 2019) failed to calculate for the following reasons:    The patient has a prior MI or stroke diagnosis      Assessment/Plan     Problem List Items Addressed This Visit       Type 2 diabetes mellitus with hyperglycemia, without long-term current use of insulin (CMS/[facility])     Patient's diabetes is not currently at goal. Her most recent A1c from 2/20/2024 was 13.6 which is above the goal A1c of <7%.    Patient states that she has not been able to  the testing supplies from [facility]. She states that they told her there was some type of delay with getting them, so she has not been testing her BG regularly. Will send new prescriptions for  home delivery to mail out to her.     Patient did not have much time to talk during this appointment. In future, can discuss a SGLT2.     PLAN:  Continue  Metformin 1000 mg; take 1 tablet by mouth twice a day  Ozempic 1 mg/dose; inject 1 mg under the skin weekly  Begin testing BG when new supplies are delivered.         Relevant Medications    blood-glucose meter (Accu-Chek Guide Glucose Meter) misc    blood sugar diagnostic (Accu-Chek Guide test strips) strip    lancets misc    Other Relevant Orders    Follow Up In [facility] - Pharmacy       Type 2 diabetes mellitus, is not at goal. Goal A1C: <7%    Follow up: I recommend diabetes care be 2 weeks.    Shahrzad Ortiz, Pharm. D.  PGY-1 Pharmacy Resident    Continue all meds under the continuation of care with the referring provider and clinical pharmacy team

## 2024-05-02 ENCOUNTER — APPOINTMENT (OUTPATIENT)
Dept: NEUROLOGY | Facility: CLINIC | Age: 64
End: 2024-05-02
Payer: COMMERCIAL

## 2024-05-07 ENCOUNTER — TELEMEDICINE (OUTPATIENT)
Dept: PHARMACY | Facility: HOSPITAL | Age: 64
End: 2024-05-07
Payer: COMMERCIAL

## 2024-05-07 ENCOUNTER — APPOINTMENT (OUTPATIENT)
Dept: PHARMACY | Facility: HOSPITAL | Age: 64
End: 2024-05-07
Payer: COMMERCIAL

## 2024-05-07 DIAGNOSIS — E11.65 TYPE 2 DIABETES MELLITUS WITH HYPERGLYCEMIA, WITHOUT LONG-TERM CURRENT USE OF INSULIN (MULTI): ICD-10-CM

## 2024-05-09 NOTE — PROGRESS NOTES
Subjective     Patient ID: Aury Lau is a 64 y.o. female who presents for Diabetes.    Referring Provider: Valarie Vides MD     Diabetes  She presents for her follow-up diabetic visit. She has type 2 diabetes mellitus. Current diabetic treatment includes oral agent (monotherapy). She is compliant with treatment all of the time. She is following a generally unhealthy diet.       Diet: Patient has been eating a lot of potatoes, rice, [place], vegetables.     Exercise: Patient does not get much exercise.    Allergies   Allergen Reactions    Gadobutrol Itching     This is to MRI contrast. Endy Payton    Grape Other     Grapes=Dizziness.    Iodides Hives    Iodinated Contrast Media Hives    Nut - Unspecified Diarrhea, Swelling and Other     Dizziness peanuts    Other Itching    Penicillins Hives and Itching    Raspberry Itching    Strawberry Itching       Objective     Current DM Pharmacotherapy:   Metformin 1000 mg; take 1 tablet by mouth twice a day with meals  Ozempic 1 mg/dose; inject 1 mg under skin weekly    SECONDARY PREVENTION  - Statin? Yes  - ACE-I/ARB? Yes  - Aspirin? No    Current monitoring regimen:   Patient has still not received supplies      Pertinent PMH Review:  - PMH of Pancreatitis: No  - PMH/FH of Medullary Thyroid Cancer: No  - PMH/FH of Multiple Endocrine Neoplasia (MEN) Type II: No  - PMH of Retinopathy: No  - PMH of Urinary Tract Infections: Patient has a history of yeast infections.     Lab Review  Lab Results   Component Value Date    BILITOT 0.6 02/20/2024    CALCIUM 9.6 02/20/2024    CO2 26 02/20/2024     02/20/2024    CREATININE 0.92 02/20/2024    GLUCOSE 361 (H) 02/20/2024    ALKPHOS 104 02/20/2024    K 4.3 02/20/2024    PROT 6.6 02/20/2024     02/20/2024    AST 18 02/20/2024    ALT 19 02/20/2024    BUN 14 02/20/2024    ANIONGAP 14 02/20/2024    ALBUMIN 4.0 02/20/2024    GFRF 53 (A) 08/14/2023     Lab Results   Component Value Date    TRIG 126 08/14/2023    CHOL 107 08/14/2023     HDL 40.5 08/14/2023     Lab Results   Component Value Date    HGBA1C 13.6 (H) 02/20/2024     The ASCVD Risk score (Jyoti DK, et al., 2019) failed to calculate for the following reasons:    The patient has a prior MI or stroke diagnosis      Assessment/Plan     Problem List Items Addressed This Visit       Type 2 diabetes mellitus with hyperglycemia, without long-term current use of insulin (Multi)     Obtain testing supplies, start testing at least FBG daily.     CONTINUE  Metformin 1000 mg; take 1 tablet by mouth twice a day with meals  Ozempic 1 mg/dose; inject 1 mg under skin weekly            Type 2 diabetes mellitus, is not at goal. Goal A1C: <7%    Follow up: I recommend diabetes care be 2 weeks.    Destinee Mittal PharmD [facility]  Clinical Pharmacy Specialist, Primary Care     Continue all meds under the continuation of care with the referring provider and clinical pharmacy team

## 2024-05-09 NOTE — ASSESSMENT & PLAN NOTE
Obtain testing supplies, start testing at least FBG daily.     CONTINUE  Metformin 1000 mg; take 1 tablet by mouth twice a day with meals  Ozempic 1 mg/dose; inject 1 mg under skin weekly

## 2024-06-17 ENCOUNTER — LAB (OUTPATIENT)
Dept: LAB | Facility: LAB | Age: 64
End: 2024-06-17
Payer: COMMERCIAL

## 2024-06-17 ENCOUNTER — APPOINTMENT (OUTPATIENT)
Dept: LAB | Facility: LAB | Age: 64
End: 2024-06-17
Payer: COMMERCIAL

## 2024-06-17 ENCOUNTER — APPOINTMENT (OUTPATIENT)
Dept: PRIMARY CARE | Facility: CLINIC | Age: 64
End: 2024-06-17
Payer: COMMERCIAL

## 2024-06-17 VITALS
HEIGHT: 64 IN | DIASTOLIC BLOOD PRESSURE: 70 MMHG | WEIGHT: 175.7 LBS | HEART RATE: 86 BPM | SYSTOLIC BLOOD PRESSURE: 121 MMHG | BODY MASS INDEX: 30 KG/M2

## 2024-06-17 DIAGNOSIS — E11.69 TYPE 2 DIABETES MELLITUS WITH OTHER SPECIFIED COMPLICATION, UNSPECIFIED WHETHER LONG TERM INSULIN USE (MULTI): Primary | ICD-10-CM

## 2024-06-17 DIAGNOSIS — E11.65 TYPE 2 DIABETES MELLITUS WITH HYPERGLYCEMIA, WITHOUT LONG-TERM CURRENT USE OF INSULIN (MULTI): ICD-10-CM

## 2024-06-17 DIAGNOSIS — E11.69 TYPE 2 DIABETES MELLITUS WITH OTHER SPECIFIED COMPLICATION, UNSPECIFIED WHETHER LONG TERM INSULIN USE (MULTI): ICD-10-CM

## 2024-06-17 DIAGNOSIS — Z51.81 MEDICATION MONITORING ENCOUNTER: ICD-10-CM

## 2024-06-17 DIAGNOSIS — I10 ESSENTIAL HYPERTENSION: ICD-10-CM

## 2024-06-17 DIAGNOSIS — G50.0 TRIGEMINAL NEURALGIA: ICD-10-CM

## 2024-06-17 DIAGNOSIS — Z91.148 NONCOMPLIANCE WITH MEDICATIONS: ICD-10-CM

## 2024-06-17 LAB
ANION GAP SERPL CALC-SCNC: 16 MMOL/L (ref 10–20)
BUN SERPL-MCNC: 18 MG/DL (ref 6–23)
CALCIUM SERPL-MCNC: 10.2 MG/DL (ref 8.6–10.6)
CHLORIDE SERPL-SCNC: 99 MMOL/L (ref 98–107)
CHOLEST SERPL-MCNC: 115 MG/DL (ref 0–199)
CHOLESTEROL/HDL RATIO: 2.8
CO2 SERPL-SCNC: 27 MMOL/L (ref 21–32)
CREAT SERPL-MCNC: 1.04 MG/DL (ref 0.5–1.05)
EGFRCR SERPLBLD CKD-EPI 2021: 59 ML/MIN/1.73M*2
EST. AVERAGE GLUCOSE BLD GHB EST-MCNC: 289 MG/DL
GLUCOSE SERPL-MCNC: 314 MG/DL (ref 74–99)
HBA1C MFR BLD: 11.7 %
HDLC SERPL-MCNC: 40.7 MG/DL
NON-HDL CHOLESTEROL: 74 MG/DL (ref 0–149)
POTASSIUM SERPL-SCNC: 4.3 MMOL/L (ref 3.5–5.3)
SODIUM SERPL-SCNC: 138 MMOL/L (ref 136–145)

## 2024-06-17 PROCEDURE — 80048 BASIC METABOLIC PNL TOTAL CA: CPT

## 2024-06-17 PROCEDURE — 3074F SYST BP LT 130 MM HG: CPT | Performed by: STUDENT IN AN ORGANIZED HEALTH CARE EDUCATION/TRAINING PROGRAM

## 2024-06-17 PROCEDURE — 3046F HEMOGLOBIN A1C LEVEL >9.0%: CPT | Performed by: STUDENT IN AN ORGANIZED HEALTH CARE EDUCATION/TRAINING PROGRAM

## 2024-06-17 PROCEDURE — 3061F NEG MICROALBUMINURIA REV: CPT | Performed by: STUDENT IN AN ORGANIZED HEALTH CARE EDUCATION/TRAINING PROGRAM

## 2024-06-17 PROCEDURE — 82465 ASSAY BLD/SERUM CHOLESTEROL: CPT

## 2024-06-17 PROCEDURE — 1159F MED LIST DOCD IN RCRD: CPT | Performed by: STUDENT IN AN ORGANIZED HEALTH CARE EDUCATION/TRAINING PROGRAM

## 2024-06-17 PROCEDURE — 36415 COLL VENOUS BLD VENIPUNCTURE: CPT

## 2024-06-17 PROCEDURE — 83718 ASSAY OF LIPOPROTEIN: CPT

## 2024-06-17 PROCEDURE — 4010F ACE/ARB THERAPY RXD/TAKEN: CPT | Performed by: STUDENT IN AN ORGANIZED HEALTH CARE EDUCATION/TRAINING PROGRAM

## 2024-06-17 PROCEDURE — 83036 HEMOGLOBIN GLYCOSYLATED A1C: CPT

## 2024-06-17 PROCEDURE — 99214 OFFICE O/P EST MOD 30 MIN: CPT | Performed by: STUDENT IN AN ORGANIZED HEALTH CARE EDUCATION/TRAINING PROGRAM

## 2024-06-17 PROCEDURE — 1036F TOBACCO NON-USER: CPT | Performed by: STUDENT IN AN ORGANIZED HEALTH CARE EDUCATION/TRAINING PROGRAM

## 2024-06-17 PROCEDURE — 1160F RVW MEDS BY RX/DR IN RCRD: CPT | Performed by: STUDENT IN AN ORGANIZED HEALTH CARE EDUCATION/TRAINING PROGRAM

## 2024-06-17 PROCEDURE — 3078F DIAST BP <80 MM HG: CPT | Performed by: STUDENT IN AN ORGANIZED HEALTH CARE EDUCATION/TRAINING PROGRAM

## 2024-06-17 RX ORDER — BLOOD SUGAR DIAGNOSTIC
STRIP MISCELLANEOUS
Qty: 100 STRIP | Refills: 3 | Status: SHIPPED | OUTPATIENT
Start: 2024-06-17

## 2024-06-17 RX ORDER — TIZANIDINE 4 MG/1
4 TABLET ORAL EVERY 6 HOURS PRN
Qty: 30 TABLET | Refills: 2 | Status: SHIPPED | OUTPATIENT
Start: 2024-06-17 | End: 2024-07-10

## 2024-06-17 RX ORDER — ISOPROPYL ALCOHOL 70 ML/100ML
1 SWAB TOPICAL DAILY PRN
Qty: 100 EACH | Refills: 0 | Status: SHIPPED | OUTPATIENT
Start: 2024-06-17

## 2024-06-17 RX ORDER — LISINOPRIL 20 MG/1
20 TABLET ORAL DAILY
Qty: 90 TABLET | Refills: 1 | Status: SHIPPED | OUTPATIENT
Start: 2024-06-17

## 2024-06-17 RX ORDER — DEXTROSE 4 G
TABLET,CHEWABLE ORAL
Qty: 1 EACH | Refills: 0 | Status: SHIPPED | OUTPATIENT
Start: 2024-06-17

## 2024-06-17 NOTE — ASSESSMENT & PLAN NOTE
I had a long discussion with patient regarding the importance of adherence with her medications and controlling her diabetes As uncontrolled diabetes increase her risk of cardiovascular disease, chronic renal failure, CVA, CAD and potential death.

## 2024-06-17 NOTE — PATIENT INSTRUCTIONS
Blood work ordered  Please follow up with our pharmacy team  I wanted to let you know that I will be leaving my practice here as of 06/18/2024. To ensure you continue to receive care that you need, it is important to establish care with a new primary care provider. Our office can provide you with a list of PCPs who are accepting new patients in this area. You can also visit  website or call the  Central Scheduling line at [phone] to find a new primary care provider.

## 2024-06-17 NOTE — PROGRESS NOTES
Subjective   Patient ID: Aury Lau is a 64 y.o. female who presents for Diabetes and Hypertension.  HPI  Patient is here to follow-up on diabetes and hypertension.  Her blood pressure is well-controlled.  She is currently taking lisinopril 20 mg daily.  Regarding her diabetes and she was following up with our clinical pharmacist Destinee.  Her last hemoglobin A1c was 13.6% in February.  She has not been checking her blood glucose level as she reports that she did not get the glucometer and test strips.  She did not notify her myself or our clinical pharmacist that she did not receive her glucometer or test strips.  She is currently taking metformin 1000 mg twice daily.  She was prescribed Ozempic in February which she never started taking the medication and did not inform myself nor our clinical pharmacist.  She reports that she read on the pamphlet that Ozempic could cause kidney dysfunction and therefore she decided not to start this medication.  I had a long discussion with patient regarding the importance of adherence with her medications and controlling her diabetes As uncontrolled diabetes increase her risk of cardiovascular disease, chronic renal failure, CVA, CAD and potential death.  She has not called our clinical pharmacist for a follow-up yet.      Lab Results   Component Value Date    HGBA1C 13.6 (H) 02/20/2024       Review of Systems   All other systems reviewed and are negative.      Visit Vitals  /70   Pulse 86      Vitals:    06/17/24 1626   Weight: 79.7 kg (175 lb 11.2 oz)        Objective   Physical Exam  Constitutional:       General: She is not in acute distress.     Appearance: Normal appearance.   HENT:      Head: Normocephalic and atraumatic.   Eyes:      Conjunctiva/sclera: Conjunctivae normal.   Cardiovascular:      Rate and Rhythm: Normal rate and regular rhythm.      Heart sounds: Normal heart sounds.   Pulmonary:      Effort: Pulmonary effort is normal.      Breath sounds: Normal  breath sounds. No wheezing.   Abdominal:      Palpations: Abdomen is soft.   Musculoskeletal:      Cervical back: Neck supple.      Right lower leg: No edema.      Left lower leg: No edema.   Skin:     General: Skin is warm and dry.   Neurological:      General: No focal deficit present.      Mental Status: She is alert and oriented to person, place, and time.   Psychiatric:         Mood and Affect: Mood normal.         Behavior: Behavior normal.         Assessment/Plan   Problem List Items Addressed This Visit       Essential hypertension    Relevant Medications    lisinopril 20 mg tablet    Type 2 diabetes mellitus with hyperglycemia, without long-term current use of insulin (Multi)    Relevant Medications    blood sugar diagnostic (Accu-Chek Guide test strips) strip    blood-glucose meter (Accu-Chek Guide Glucose Meter) misc    alcohol swabs (Alcohol Pads) pads, medicated    Other Relevant Orders    Hemoglobin A1C    Lipid Panel Non-Fasting    Type 2 diabetes mellitus with other specified complication, unspecified whether long term insulin use (Multi) - Primary    Relevant Orders    Hemoglobin A1C    Noncompliance with medications     I had a long discussion with patient regarding the importance of adherence with her medications and controlling her diabetes As uncontrolled diabetes increase her risk of cardiovascular disease, chronic renal failure, CVA, CAD and potential death.         Trigeminal neuralgia    Relevant Medications    tiZANidine (Zanaflex) 4 mg tablet              This note was dictated using voice recognition software and not corrected for grammatical or spelling errors.

## 2024-06-19 ENCOUNTER — TELEPHONE (OUTPATIENT)
Dept: PRIMARY CARE | Facility: CLINIC | Age: 64
End: 2024-06-19
Payer: COMMERCIAL

## 2024-06-19 NOTE — TELEPHONE ENCOUNTER
Spoke with patient Wed 6/19/24, she understood pcp message. Patient stated she would call to establish care with a new provider.

## 2024-06-26 ENCOUNTER — APPOINTMENT (OUTPATIENT)
Dept: RADIOLOGY | Facility: HOSPITAL | Age: 64
End: 2024-06-26
Payer: MEDICARE

## 2024-06-26 ENCOUNTER — HOSPITAL ENCOUNTER (EMERGENCY)
Facility: HOSPITAL | Age: 64
Discharge: HOME | End: 2024-06-26
Attending: STUDENT IN AN ORGANIZED HEALTH CARE EDUCATION/TRAINING PROGRAM
Payer: MEDICARE

## 2024-06-26 VITALS
WEIGHT: 185 LBS | HEART RATE: 92 BPM | TEMPERATURE: 96.1 F | BODY MASS INDEX: 31.58 KG/M2 | HEIGHT: 64 IN | DIASTOLIC BLOOD PRESSURE: 86 MMHG | SYSTOLIC BLOOD PRESSURE: 162 MMHG | OXYGEN SATURATION: 100 % | RESPIRATION RATE: 18 BRPM

## 2024-06-26 DIAGNOSIS — S39.012A LUMBAR STRAIN, INITIAL ENCOUNTER: ICD-10-CM

## 2024-06-26 DIAGNOSIS — Z04.1 EXAM FOLLOWING MVC (MOTOR VEHICLE COLLISION), NO APPARENT INJURY: Primary | ICD-10-CM

## 2024-06-26 PROCEDURE — 99284 EMERGENCY DEPT VISIT MOD MDM: CPT

## 2024-06-26 PROCEDURE — 72131 CT LUMBAR SPINE W/O DYE: CPT | Performed by: RADIOLOGY

## 2024-06-26 PROCEDURE — 72131 CT LUMBAR SPINE W/O DYE: CPT

## 2024-06-26 RX ORDER — ACETAMINOPHEN 325 MG/1
975 TABLET ORAL ONCE
Status: COMPLETED | OUTPATIENT
Start: 2024-06-26 | End: 2024-06-26

## 2024-06-26 ASSESSMENT — LIFESTYLE VARIABLES
HAVE PEOPLE ANNOYED YOU BY CRITICIZING YOUR DRINKING: NO
EVER HAD A DRINK FIRST THING IN THE MORNING TO STEADY YOUR NERVES TO GET RID OF A HANGOVER: NO
HAVE YOU EVER FELT YOU SHOULD CUT DOWN ON YOUR DRINKING: NO
TOTAL SCORE: 0
EVER FELT BAD OR GUILTY ABOUT YOUR DRINKING: NO

## 2024-06-26 ASSESSMENT — COLUMBIA-SUICIDE SEVERITY RATING SCALE - C-SSRS
2. HAVE YOU ACTUALLY HAD ANY THOUGHTS OF KILLING YOURSELF?: NO
1. IN THE PAST MONTH, HAVE YOU WISHED YOU WERE DEAD OR WISHED YOU COULD GO TO SLEEP AND NOT WAKE UP?: NO
6. HAVE YOU EVER DONE ANYTHING, STARTED TO DO ANYTHING, OR PREPARED TO DO ANYTHING TO END YOUR LIFE?: NO

## 2024-06-26 ASSESSMENT — PAIN DESCRIPTION - LOCATION: LOCATION: BACK

## 2024-06-26 ASSESSMENT — PAIN - FUNCTIONAL ASSESSMENT: PAIN_FUNCTIONAL_ASSESSMENT: 0-10

## 2024-06-26 ASSESSMENT — PAIN SCALES - GENERAL: PAINLEVEL_OUTOF10: 8
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
## 2024-07-24 DIAGNOSIS — E11.65 TYPE 2 DIABETES MELLITUS WITH HYPERGLYCEMIA, WITHOUT LONG-TERM CURRENT USE OF INSULIN (MULTI): ICD-10-CM

## 2024-07-25 RX ORDER — SEMAGLUTIDE 1.34 MG/ML
1 INJECTION, SOLUTION SUBCUTANEOUS
Qty: 3 ML | Refills: 2 | Status: SHIPPED | OUTPATIENT
Start: 2024-07-28

## 2024-08-20 DIAGNOSIS — E78.2 MIXED HYPERLIPIDEMIA: ICD-10-CM

## 2024-08-21 RX ORDER — ROSUVASTATIN CALCIUM 40 MG/1
40 TABLET, COATED ORAL DAILY
Qty: 90 TABLET | Refills: 1 | Status: SHIPPED | OUTPATIENT
Start: 2024-08-21

## 2024-08-29 ENCOUNTER — APPOINTMENT (OUTPATIENT)
Dept: PRIMARY CARE | Facility: CLINIC | Age: 64
End: 2024-08-29
Payer: COMMERCIAL

## 2024-08-29 VITALS
SYSTOLIC BLOOD PRESSURE: 106 MMHG | HEART RATE: 109 BPM | BODY MASS INDEX: 29.88 KG/M2 | WEIGHT: 174.1 LBS | DIASTOLIC BLOOD PRESSURE: 65 MMHG

## 2024-08-29 DIAGNOSIS — G50.0 TRIGEMINAL NEURALGIA: ICD-10-CM

## 2024-08-29 DIAGNOSIS — Z78.0 MENOPAUSE: ICD-10-CM

## 2024-08-29 DIAGNOSIS — E11.69 TYPE 2 DIABETES MELLITUS WITH OTHER SPECIFIED COMPLICATION, UNSPECIFIED WHETHER LONG TERM INSULIN USE (MULTI): Primary | ICD-10-CM

## 2024-08-29 DIAGNOSIS — Z00.00 ROUTINE GENERAL MEDICAL EXAMINATION AT HEALTH CARE FACILITY: ICD-10-CM

## 2024-08-29 DIAGNOSIS — Z00.00 HEALTH CARE MAINTENANCE: ICD-10-CM

## 2024-08-29 RX ORDER — TIZANIDINE 4 MG/1
4 TABLET ORAL EVERY 6 HOURS PRN
Qty: 30 TABLET | Refills: 2 | Status: SHIPPED | OUTPATIENT
Start: 2024-08-29 | End: 2024-09-21

## 2024-08-29 RX ORDER — BLOOD-GLUCOSE SENSOR
EACH MISCELLANEOUS
Qty: 2 EACH | Refills: 11 | Status: SHIPPED | OUTPATIENT
Start: 2024-08-29

## 2024-08-29 ASSESSMENT — ACTIVITIES OF DAILY LIVING (ADL)
GROCERY_SHOPPING: NEEDS ASSISTANCE
BATHING: INDEPENDENT
MANAGING_FINANCES: INDEPENDENT
DOING_HOUSEWORK: INDEPENDENT
DRESSING: INDEPENDENT
TAKING_MEDICATION: INDEPENDENT

## 2024-08-29 ASSESSMENT — PATIENT HEALTH QUESTIONNAIRE - PHQ9
SUM OF ALL RESPONSES TO PHQ9 QUESTIONS 1 AND 2: 0
1. LITTLE INTEREST OR PLEASURE IN DOING THINGS: NOT AT ALL
2. FEELING DOWN, DEPRESSED OR HOPELESS: NOT AT ALL

## 2024-08-29 NOTE — PATIENT INSTRUCTIONS
It was nice seeing you today   Here is a a summary of what we discussed -  Your Blood pressure is at goal today. Please continue your current medications. Please take a low salt diet and exercise atleast for 150min/week  We have ordered some labs for you. Please proceed to the our lab at [address] to give these labs. Please ensure these labs are given in a fasting state IN   1 MONTH,  START OZEMPIC BACK AND CALL/ MESSAGE ME IF HAVE SIDE EFFECTS. YOU CAN ALSO TRY TO GET FREESTYLE.   3. Other imaging/procedure orders I have put in for you are   MAMMOGRAM  BONE DENSITY SCAN   PODIATRY     Please call the scheduling line below to set up the appointment    Once all the results are obtained we will call you with abnormal results if any and discuss necessity medication/lifestyle changes/further evaluation.  Please feel free to call our office at [phone] with any questions  Please do not hesitate to call us for medication refills.  In case of emergency please proceed to the nearest emergency room or call 911.    Please follow-up with me in 1 MONTH FOR A PHYSICAL EXAM / EKG .  You can set your appointment by stopping by at the  or calling our office at [phone] or logging onto [facility] account.

## 2024-08-29 NOTE — PROGRESS NOTES
Subjective   Patient ID: Aury Lau is a 64 y.o. female who presents for New Patient Visit.  HPI  Aury Lau is 64 y.o. 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 , NORTRITPTILINE  H/O THALAMIC INFARCT 2012  No concerns today.   Cancer screening     Colonoscopy-2021 at [facility]   Mammogram- due      Immunizations- declines   NO concerns/    No past medical history on file.   No past surgical history on file.   No family history on file.   Allergies   Allergen Reactions    Gadobutrol Itching     This is to MRI contrast. Endy Payton    Grape Other     Grapes=Dizziness.    Iodides Hives    Iodinated Contrast Media Hives    Nut - Unspecified Diarrhea, Swelling and Other     Dizziness peanuts    Other Itching    Penicillins Hives and Itching    Raspberry Itching    Strawberry Itching          Occupation:     Review of Systems   Constitutional:  Negative for activity change and fever.   HENT:  Negative for congestion.    Respiratory:  Negative for cough, shortness of breath and wheezing.    Cardiovascular:  Negative for chest pain and leg swelling.   Gastrointestinal:  Negative for abdominal pain, constipation, nausea and vomiting.   Endocrine: Negative for cold intolerance.   Genitourinary:  Negative for dysuria, hematuria and urgency.   Neurological:  Negative for dizziness, speech difficulty, weakness and numbness.   Psychiatric/Behavioral:  Negative for self-injury and suicidal ideas.        Objective   Visit Vitals  /65   Pulse 109   Wt 79 kg (174 lb 1.6 oz)   BMI 29.88 kg/m²   Smoking Status Never   BSA 1.89 m²      Physical Exam  Constitutional:       Appearance: Normal appearance.   HENT:      Head: Normocephalic and atraumatic.      Nose:  Nose normal.      Mouth/Throat:      Mouth: Mucous membranes are moist.   Eyes:      Conjunctiva/sclera: Conjunctivae normal.      Pupils: Pupils are equal, round, and reactive to light.   Cardiovascular:      Rate and Rhythm: Normal rate and regular rhythm.      Pulses: Normal pulses.      Heart sounds: Normal heart sounds.   Pulmonary:      Effort: Pulmonary effort is normal.      Breath sounds: Normal breath sounds.   Musculoskeletal:         General: Normal range of motion.      Cervical back: Neck supple.   Skin:     General: Skin is warm.   Neurological:      General: No focal deficit present.      Mental Status: She is alert and oriented to person, place, and time.   Psychiatric:         Mood and Affect: Mood normal.         Behavior: Behavior normal.         Thought Content: Thought content normal.         Judgment: Judgment normal.         Assessment/Plan   Diagnoses and all orders for this visit:  Type 2 diabetes mellitus with other specified complication, unspecified whether long term insulin use (Multi)  -     FreeStyle Kinsey 3 Sensor device; CHANGE EVERY 14 DAYS  -     Referral to Podiatry; Future  -     Hemoglobin A1C; Future  -     Albumin-Creatinine Ratio, Urine Random; Future  -     Creatinine, Urine Random; Future  -     Lipid Panel; Future  -     Comprehensive Metabolic Panel; Future  -     CBC; Future  -     TSH with reflex to Free T4 if abnormal; Future  Trigeminal neuralgia  -     tiZANidine (Zanaflex) 4 mg tablet; Take 1 tablet (4 mg) by mouth every 6 hours if needed for muscle spasms for up to 23 days. MAX dose 24 mg/per day  Health care maintenance  -     XR DEXA bone density; Future  Menopause  -     XR DEXA bone density; Future  Routine general medical examination at health care facility  -     1 Year Follow Up In [facility] - PCP - Wellness Exam; Future     DECLINES vaccines    Your Blood pressure is at goal today. Please continue your current medications. Please take a low  salt diet and exercise atleast for 150min/week  We have ordered some labs for you. Please proceed to the our lab at [address] to give these labs. Please ensure these labs are given in a fasting state IN   1 MONTH,  START OZEMPIC BACK AND CALL/ MESSAGE ME IF HAVE SIDE EFFECTS. YOU CAN ALSO TRY TO GET FREESTYLE.   3. Other imaging/procedure orders I have put in for you are   MAMMOGRAM  BONE DENSITY SCAN   PODIATRY

## 2024-09-13 ASSESSMENT — ENCOUNTER SYMPTOMS
ACTIVITY CHANGE: 0
NAUSEA: 0
FEVER: 0
COUGH: 0
SHORTNESS OF BREATH: 0
DIZZINESS: 0
HEMATURIA: 0
VOMITING: 0
WEAKNESS: 0
SPEECH DIFFICULTY: 0
CONSTIPATION: 0
NUMBNESS: 0
DYSURIA: 0
ABDOMINAL PAIN: 0
WHEEZING: 0

## 2024-09-24 ENCOUNTER — HOSPITAL ENCOUNTER (OUTPATIENT)
Dept: RADIOLOGY | Facility: CLINIC | Age: 64
Discharge: HOME | End: 2024-09-24
Payer: COMMERCIAL

## 2024-09-24 VITALS — WEIGHT: 174.16 LBS | BODY MASS INDEX: 29.73 KG/M2 | HEIGHT: 64 IN

## 2024-09-24 DIAGNOSIS — Z12.31 BREAST CANCER SCREENING BY MAMMOGRAM: ICD-10-CM

## 2024-09-24 PROCEDURE — 77067 SCR MAMMO BI INCL CAD: CPT

## 2024-09-30 ENCOUNTER — APPOINTMENT (OUTPATIENT)
Dept: PRIMARY CARE | Facility: CLINIC | Age: 64
End: 2024-09-30
Payer: COMMERCIAL

## 2024-10-07 ENCOUNTER — HOSPITAL ENCOUNTER (OUTPATIENT)
Dept: RADIOLOGY | Facility: EXTERNAL LOCATION | Age: 64
Discharge: HOME | End: 2024-10-07
Payer: COMMERCIAL

## 2024-10-15 ENCOUNTER — LAB (OUTPATIENT)
Dept: LAB | Facility: LAB | Age: 64
End: 2024-10-15

## 2024-10-15 ENCOUNTER — APPOINTMENT (OUTPATIENT)
Dept: PRIMARY CARE | Facility: CLINIC | Age: 64
End: 2024-10-15
Payer: COMMERCIAL

## 2024-10-15 VITALS
OXYGEN SATURATION: 98 % | WEIGHT: 173 LBS | DIASTOLIC BLOOD PRESSURE: 82 MMHG | SYSTOLIC BLOOD PRESSURE: 125 MMHG | BODY MASS INDEX: 29.68 KG/M2 | HEART RATE: 102 BPM

## 2024-10-15 DIAGNOSIS — G50.0 TRIGEMINAL NEURALGIA: ICD-10-CM

## 2024-10-15 DIAGNOSIS — R10.11 RUQ PAIN: Primary | ICD-10-CM

## 2024-10-15 DIAGNOSIS — E11.69 TYPE 2 DIABETES MELLITUS WITH OTHER SPECIFIED COMPLICATION, UNSPECIFIED WHETHER LONG TERM INSULIN USE (MULTI): ICD-10-CM

## 2024-10-15 LAB
ALBUMIN SERPL BCP-MCNC: 4.3 G/DL (ref 3.4–5)
ALP SERPL-CCNC: 73 U/L (ref 33–136)
ALT SERPL W P-5'-P-CCNC: 30 U/L (ref 7–45)
ANION GAP SERPL CALC-SCNC: 16 MMOL/L (ref 10–20)
AST SERPL W P-5'-P-CCNC: 23 U/L (ref 9–39)
BILIRUB SERPL-MCNC: 0.7 MG/DL (ref 0–1.2)
BUN SERPL-MCNC: 19 MG/DL (ref 6–23)
CALCIUM SERPL-MCNC: 10.2 MG/DL (ref 8.6–10.6)
CHLORIDE SERPL-SCNC: 102 MMOL/L (ref 98–107)
CHOLEST SERPL-MCNC: 109 MG/DL (ref 0–199)
CHOLESTEROL/HDL RATIO: 2.7
CO2 SERPL-SCNC: 27 MMOL/L (ref 21–32)
CREAT SERPL-MCNC: 1.09 MG/DL (ref 0.5–1.05)
CREAT UR-MCNC: 227.1 MG/DL (ref 20–320)
EGFRCR SERPLBLD CKD-EPI 2021: 56 ML/MIN/1.73M*2
ERYTHROCYTE [DISTWIDTH] IN BLOOD BY AUTOMATED COUNT: 12.9 % (ref 11.5–14.5)
EST. AVERAGE GLUCOSE BLD GHB EST-MCNC: 275 MG/DL
GLUCOSE SERPL-MCNC: 184 MG/DL (ref 74–99)
HBA1C MFR BLD: 11.2 %
HCT VFR BLD AUTO: 41.9 % (ref 36–46)
HDLC SERPL-MCNC: 40.2 MG/DL
HGB BLD-MCNC: 13.6 G/DL (ref 12–16)
LDLC SERPL CALC-MCNC: 49 MG/DL
MCH RBC QN AUTO: 28.8 PG (ref 26–34)
MCHC RBC AUTO-ENTMCNC: 32.5 G/DL (ref 32–36)
MCV RBC AUTO: 89 FL (ref 80–100)
MICROALBUMIN UR-MCNC: 218.7 MG/L
MICROALBUMIN/CREAT UR: 96.3 UG/MG CREAT
NON HDL CHOLESTEROL: 69 MG/DL (ref 0–149)
NRBC BLD-RTO: 0 /100 WBCS (ref 0–0)
PLATELET # BLD AUTO: 249 X10*3/UL (ref 150–450)
POTASSIUM SERPL-SCNC: 4.4 MMOL/L (ref 3.5–5.3)
PROT SERPL-MCNC: 7 G/DL (ref 6.4–8.2)
RBC # BLD AUTO: 4.73 X10*6/UL (ref 4–5.2)
SODIUM SERPL-SCNC: 141 MMOL/L (ref 136–145)
TRIGL SERPL-MCNC: 97 MG/DL (ref 0–149)
TSH SERPL-ACNC: 2.2 MIU/L (ref 0.44–3.98)
VLDL: 19 MG/DL (ref 0–40)
WBC # BLD AUTO: 10.4 X10*3/UL (ref 4.4–11.3)

## 2024-10-15 PROCEDURE — 3046F HEMOGLOBIN A1C LEVEL >9.0%: CPT | Performed by: STUDENT IN AN ORGANIZED HEALTH CARE EDUCATION/TRAINING PROGRAM

## 2024-10-15 PROCEDURE — 99213 OFFICE O/P EST LOW 20 MIN: CPT | Performed by: STUDENT IN AN ORGANIZED HEALTH CARE EDUCATION/TRAINING PROGRAM

## 2024-10-15 PROCEDURE — 80061 LIPID PANEL: CPT

## 2024-10-15 PROCEDURE — 83036 HEMOGLOBIN GLYCOSYLATED A1C: CPT

## 2024-10-15 PROCEDURE — 3060F POS MICROALBUMINURIA REV: CPT | Performed by: STUDENT IN AN ORGANIZED HEALTH CARE EDUCATION/TRAINING PROGRAM

## 2024-10-15 PROCEDURE — 3074F SYST BP LT 130 MM HG: CPT | Performed by: STUDENT IN AN ORGANIZED HEALTH CARE EDUCATION/TRAINING PROGRAM

## 2024-10-15 PROCEDURE — 84443 ASSAY THYROID STIM HORMONE: CPT

## 2024-10-15 PROCEDURE — 1123F ACP DISCUSS/DSCN MKR DOCD: CPT | Performed by: STUDENT IN AN ORGANIZED HEALTH CARE EDUCATION/TRAINING PROGRAM

## 2024-10-15 PROCEDURE — 3079F DIAST BP 80-89 MM HG: CPT | Performed by: STUDENT IN AN ORGANIZED HEALTH CARE EDUCATION/TRAINING PROGRAM

## 2024-10-15 PROCEDURE — 3048F LDL-C <100 MG/DL: CPT | Performed by: STUDENT IN AN ORGANIZED HEALTH CARE EDUCATION/TRAINING PROGRAM

## 2024-10-15 PROCEDURE — 80053 COMPREHEN METABOLIC PANEL: CPT

## 2024-10-15 PROCEDURE — 85027 COMPLETE CBC AUTOMATED: CPT

## 2024-10-15 PROCEDURE — 4010F ACE/ARB THERAPY RXD/TAKEN: CPT | Performed by: STUDENT IN AN ORGANIZED HEALTH CARE EDUCATION/TRAINING PROGRAM

## 2024-10-15 PROCEDURE — 82043 UR ALBUMIN QUANTITATIVE: CPT

## 2024-10-15 PROCEDURE — 36415 COLL VENOUS BLD VENIPUNCTURE: CPT

## 2024-10-15 PROCEDURE — 82570 ASSAY OF URINE CREATININE: CPT

## 2024-10-15 RX ORDER — TIZANIDINE 4 MG/1
4 TABLET ORAL EVERY 6 HOURS PRN
Qty: 30 TABLET | Refills: 3 | Status: SHIPPED | OUTPATIENT
Start: 2024-10-15 | End: 2024-11-14

## 2024-10-15 NOTE — PROGRESS NOTES
Subjective   Patient ID: Aury Lau is a 64 y.o. female who presents for Follow-up.  HPI  Aury Lau is 64 y.o. is here for follow up     Chronic active medical problems   Hyperlipidemia crestor 40  Hypertension: lisnopril 20  DM: Reports to have stopped  Ozempic; REPROTS TO BE TAKING ONLY METFORMIN AND GLIMEPIRIDE (OLD PRESCRITIPIO[ _  TRIGEMINAL neuralgia on TRILEPTA. , NORTRITPTILINE  H/O THALAMIC INFARCT 2012  No concerns today.   Cancer screening      Colonoscopy-2021 at [facility]   Mammogram- due      # Ruq pain   On and off since a few days  Mild to mod intensity   Some nausea, no vomiting   No changes in bowel or bladder habits      No past medical history on file.   No past surgical history on file.   Family History   Problem Relation Name Age of Onset    Breast cancer Mother      Ovarian cancer Mother        Allergies   Allergen Reactions    Gadobutrol Itching     This is to MRI contrast. Endy Payton    Grape Other     Grapes=Dizziness.    Iodides Hives    Iodinated Contrast Media Hives    Nut - Unspecified Diarrhea, Swelling and Other     Dizziness peanuts    Other Itching    Penicillins Hives and Itching    Raspberry Itching    Strawberry Itching          Occupation:     Review of Systems   Constitutional:  Negative for activity change and fever.   HENT:  Negative for congestion.    Respiratory:  Negative for cough, shortness of breath and wheezing.    Cardiovascular:  Negative for chest pain and leg swelling.   Gastrointestinal:  Negative for abdominal pain, constipation, nausea and vomiting.   Endocrine: Negative for cold intolerance.   Genitourinary:  Negative for dysuria, hematuria and urgency.   Neurological:  Negative for dizziness, speech difficulty, weakness and numbness.   Psychiatric/Behavioral:  Negative for self-injury and suicidal ideas.        Objective   Visit Vitals  /82   Pulse 102   Wt 78.5 kg (173 lb)   SpO2 98%   BMI 29.68 kg/m²   OB Status Postmenopausal   Smoking  Status Never   BSA 1.88 m²      Physical Exam  Constitutional:       Appearance: Normal appearance.   HENT:      Head: Normocephalic and atraumatic.      Nose: Nose normal.      Mouth/Throat:      Mouth: Mucous membranes are moist.   Eyes:      Conjunctiva/sclera: Conjunctivae normal.      Pupils: Pupils are equal, round, and reactive to light.   Cardiovascular:      Rate and Rhythm: Normal rate and regular rhythm.      Pulses: Normal pulses.      Heart sounds: Normal heart sounds.   Pulmonary:      Effort: Pulmonary effort is normal.      Breath sounds: Normal breath sounds.   Abdominal:      Tenderness: There is no abdominal tenderness. There is no guarding or rebound.   Musculoskeletal:         General: Normal range of motion.      Cervical back: Neck supple.   Skin:     General: Skin is warm.   Neurological:      General: No focal deficit present.      Mental Status: She is alert and oriented to person, place, and time.   Psychiatric:         Mood and Affect: Mood normal.         Behavior: Behavior normal.         Thought Content: Thought content normal.         Judgment: Judgment normal.         Assessment/Plan   Diagnoses and all orders for this visit:  RUQ pain  No signs of acute abdomen   -     US right upper quadrant; Future  Trigeminal neuralgia  -     tiZANidine (Zanaflex) 4 mg tablet; Take 1 tablet (4 mg) by mouth every 6 hours if needed for muscle spasms. MAX dose 24 mg/per day  Patient to seek medical attention immediately / call 911  if has worsening of symptoms  or develops alarm symptoms as discussed. Verbalizes understanding

## 2024-10-15 NOTE — PATIENT INSTRUCTIONS
It was nice seeing you today   Here is a a summary of what we discussed -  Your Blood pressure is at goal today. Please continue your current medications. Please take a low salt diet and exercise atleast for 150min/week  We have ordered some labs for you. Please proceed to the our lab at [address] to give these labs. Please ensure these labs are given in a fasting state.   3. Other imaging/procedure orders I have put in for you are   Ultrasound abdomen    Please call the scheduling line below to set up the appointment    Once all the results are obtained we will call you with abnormal results if any and discuss necessity medication/lifestyle changes/further evaluation.  Please feel free to call our office at [phone] with any questions  Please do not hesitate to call us for medication refills.  In case of emergency please proceed to the nearest emergency room or call 911.    Please follow-up with me in 3 months.  You can set your appointment by stopping by at the  or calling our office at [phone] or logging onto [facility] account.

## 2024-10-24 ASSESSMENT — ENCOUNTER SYMPTOMS
WHEEZING: 0
COUGH: 0
HEMATURIA: 0
NUMBNESS: 0
CONSTIPATION: 0
WEAKNESS: 0
SPEECH DIFFICULTY: 0
DIZZINESS: 0
ACTIVITY CHANGE: 0
FEVER: 0
ABDOMINAL PAIN: 0
VOMITING: 0
SHORTNESS OF BREATH: 0
DYSURIA: 0
NAUSEA: 0

## 2024-10-25 DIAGNOSIS — R92.8 ABNORMAL MAMMOGRAM: Primary | ICD-10-CM

## 2024-11-07 ENCOUNTER — HOSPITAL ENCOUNTER (OUTPATIENT)
Dept: RADIOLOGY | Facility: CLINIC | Age: 64
Discharge: HOME | End: 2024-11-07
Payer: COMMERCIAL

## 2024-11-07 DIAGNOSIS — R10.11 RUQ PAIN: ICD-10-CM

## 2024-11-07 DIAGNOSIS — G50.0 TRIGEMINAL NEURALGIA: ICD-10-CM

## 2024-11-07 PROCEDURE — 76705 ECHO EXAM OF ABDOMEN: CPT | Performed by: RADIOLOGY

## 2024-11-07 PROCEDURE — 76705 ECHO EXAM OF ABDOMEN: CPT

## 2024-11-08 PROCEDURE — RXMED WILLOW AMBULATORY MEDICATION CHARGE

## 2024-11-08 RX ORDER — TIZANIDINE 4 MG/1
4 TABLET ORAL EVERY 6 HOURS PRN
Qty: 30 TABLET | Refills: 3 | Status: SHIPPED | OUTPATIENT
Start: 2024-11-08 | End: 2024-12-08

## 2024-11-15 ENCOUNTER — PHARMACY VISIT (OUTPATIENT)
Dept: PHARMACY | Facility: CLINIC | Age: 64
End: 2024-11-15
Payer: COMMERCIAL

## 2024-11-27 ENCOUNTER — HOSPITAL ENCOUNTER (OUTPATIENT)
Dept: RADIOLOGY | Facility: CLINIC | Age: 64
Discharge: HOME | End: 2024-11-27
Payer: COMMERCIAL

## 2024-11-27 DIAGNOSIS — R92.8 ABNORMAL MAMMOGRAM: ICD-10-CM

## 2024-11-27 PROCEDURE — G0279 TOMOSYNTHESIS, MAMMO: HCPCS | Mod: LEFT SIDE | Performed by: RADIOLOGY

## 2024-11-27 PROCEDURE — 77065 DX MAMMO INCL CAD UNI: CPT | Mod: LEFT SIDE | Performed by: RADIOLOGY

## 2024-11-27 PROCEDURE — 77061 BREAST TOMOSYNTHESIS UNI: CPT | Mod: LT

## 2024-12-18 DIAGNOSIS — E11.65 TYPE 2 DIABETES MELLITUS WITH HYPERGLYCEMIA, WITHOUT LONG-TERM CURRENT USE OF INSULIN: Primary | ICD-10-CM

## 2024-12-18 DIAGNOSIS — E11.65 TYPE 2 DIABETES MELLITUS WITH HYPERGLYCEMIA, WITHOUT LONG-TERM CURRENT USE OF INSULIN: ICD-10-CM

## 2024-12-18 RX ORDER — SEMAGLUTIDE 1.34 MG/ML
1 INJECTION, SOLUTION SUBCUTANEOUS
Qty: 3 ML | Refills: 0 | Status: SHIPPED | OUTPATIENT
Start: 2024-12-22

## 2024-12-30 ENCOUNTER — OFFICE VISIT (OUTPATIENT)
Dept: PODIATRY | Facility: HOSPITAL | Age: 64
End: 2024-12-30
Payer: COMMERCIAL

## 2024-12-30 VITALS
HEIGHT: 64 IN | WEIGHT: 171.8 LBS | SYSTOLIC BLOOD PRESSURE: 121 MMHG | BODY MASS INDEX: 29.33 KG/M2 | DIASTOLIC BLOOD PRESSURE: 79 MMHG | OXYGEN SATURATION: 96 % | HEART RATE: 101 BPM

## 2024-12-30 DIAGNOSIS — M79.675 PAIN IN TOES OF BOTH FEET: ICD-10-CM

## 2024-12-30 DIAGNOSIS — E11.69 TYPE 2 DIABETES MELLITUS WITH OTHER SPECIFIED COMPLICATION, UNSPECIFIED WHETHER LONG TERM INSULIN USE (MULTI): ICD-10-CM

## 2024-12-30 DIAGNOSIS — M79.674 PAIN IN TOES OF BOTH FEET: ICD-10-CM

## 2024-12-30 DIAGNOSIS — E11.40 POORLY CONTROLLED TYPE 2 DIABETES MELLITUS WITH NEUROPATHY: ICD-10-CM

## 2024-12-30 DIAGNOSIS — E11.65 POORLY CONTROLLED TYPE 2 DIABETES MELLITUS WITH NEUROPATHY: ICD-10-CM

## 2024-12-30 DIAGNOSIS — B35.1 ONYCHOMYCOSIS: Primary | ICD-10-CM

## 2024-12-30 PROCEDURE — 99214 OFFICE O/P EST MOD 30 MIN: CPT | Mod: 25 | Performed by: STUDENT IN AN ORGANIZED HEALTH CARE EDUCATION/TRAINING PROGRAM

## 2024-12-30 PROCEDURE — 11721 DEBRIDE NAIL 6 OR MORE: CPT | Mod: 25 | Performed by: STUDENT IN AN ORGANIZED HEALTH CARE EDUCATION/TRAINING PROGRAM

## 2024-12-30 ASSESSMENT — PAIN SCALES - GENERAL: PAINLEVEL_OUTOF10: 0-NO PAIN

## 2024-12-30 NOTE — PROGRESS NOTES
Initial Podiatric Office Visit:    HPI:  This 64 y.o. female with PMH indicated below presents today for diabetic foot exam and nail care. Patient is diabetic, last A1c was 11.2% on 10/15/24. Patient endorses some numbness in toes. Denies burning cramping pain. States her toenails become elongated and are difficult to trim. Patient denies other constitutional symptoms and other pedal complaints today.    PCP:  Therese Graham MD:    PMH  No past medical history on file.:    MEDICATIONS  Current Outpatient Medications   Medication Sig Dispense Refill    alcohol swabs (Alcohol Pads) pads, medicated Apply 1 each topically once daily as needed (to test blood sugar). 100 each 0    blood sugar diagnostic (Accu-Chek Guide test strips) strip Use once daily to monitor blood sugar. 100 strip 3    blood-glucose meter (Accu-Chek Guide Glucose Meter) misc Use as directed to test blood sugar daily. 1 each 0    FreeStyle Kinsey 3 Sensor device CHANGE EVERY 14 DAYS 2 each 11    lancets misc Use once a day to monitor blood sugar. 100 each 3    lisinopril 20 mg tablet Take 1 tablet (20 mg) by mouth once daily. 90 tablet 1    metFORMIN (Glucophage) 1,000 mg tablet Take 1 tablet (1,000 mg) by mouth 2 times a day with meals. 60 tablet 11    nortriptyline (Pamelor) 50 mg capsule Take 1 capsule (50 mg) by mouth once daily.      OXcarbazepine (Trileptal) 150 mg tablet Take 1 tablet (150 mg) by mouth 2 times a day. 180 tablet 2    rosuvastatin (Crestor) 40 mg tablet TAKE 1 TABLET BY MOUTH EVERY DAY 90 tablet 1    semaglutide (Ozempic) 1 mg/dose (4 mg/3 mL) pen injector INJECT 1 MG UNDER THE SKIN 1 (ONE) TIME PER WEEK. 3 mL 0    tiZANidine (Zanaflex) 4 mg tablet Take 1 tablet (4 mg) by mouth every 6 hours if needed for muscle spasms. MAX dose 24 mg/per day 30 tablet 3     No current facility-administered medications for this visit.   :  Allergies   Allergen Reactions    Gadobutrol Itching     This is to MRI contrast. Endy Payton     Grape Other     Grapes=Dizziness.    Iodides Hives    Iodinated Contrast Media Hives    Nut - Unspecified Diarrhea, Swelling and Other     Dizziness peanuts    Other Itching    Penicillins Hives and Itching    Raspberry Itching    Strawberry Itching   :  No past surgical history on file.  Family History   Problem Relation Name Age of Onset    Breast cancer Mother      Ovarian cancer Mother     :  Social History     Socioeconomic History    Marital status:    Tobacco Use    Smoking status: Never    Smokeless tobacco: Never   Substance and Sexual Activity    Alcohol use: Never    Drug use: Never       Review of Systems:    GENERAL: Negative for fatigue, weight loss, malaise, or fevers.  HEENT: Negative for frequent or significant headaches.   RESPIRATORY: Negative for cough, wheezing or shortness of breath.  CARDIOVASCULAR: Negative for chest pain or palpitations.  GI: Negative for abdominal discomfort, nausea, vomiting.   MUSCULOSKELETAL: Negative for back pain or joint pain.   SKIN: Negative for lesions, rash, and itching.  NEURO: Negative for dizziness, weakness.     Physical Exam:   General: AAOx3, no acute distress    Vasc: Dorsalis pedis and posterior tibial pulses are palpable bilateral.  Capillary refill time is less than 3 seconds to the hallux bilateral. Skin temperature is warm to cool from proximal tibia to distal digits bilateral. No erythema noted. No edema noted.      Neuro: Light touch sensation is intact to the foot bilateral.  Protective sensation is intact to the foot when tested with the 5.07 SWM bilateral.      Derm: Skin is supple with normal texture and turgor noted. Nails 1-5 bilateral are dystrophic. Webspaces 1-4 are clean, dry and intact bilateral. There are no hyperkeratoses, ulcerations, verruca or other lesions noted.      Ortho: Muscle strength is +5/5 for all four pedal muscle groups bilateral. Ankle joint, subtalar joint, and 1st MPJ ROM is full and without pain or crepitus  bilateral. There are no structural deformities noted bilateral.    Assessment:  #type 2 diabetes mellitus  #onychomycosis      Plan:   - Patient was examined and findings were discussed with patient  - Chart, labs, and imaging were reviewed  - Type 2 diabetic multiple years, A1c 11.2%  - Minor peripheral neuropathy symptoms.  - Educated patient on and discussed diabetic foot care  - Instructed patient to wear protective shoe gear at all times  - Instructed patient to check feet daily  - Toenails 1-5 B/L debrided using nail nippers without incident.   - Referred to [facility] for routine nail debridement.  - RTC in 1 year for diabetic foot exam     Tez Walker DPM PGY-3

## 2025-01-17 DIAGNOSIS — E11.65 TYPE 2 DIABETES MELLITUS WITH HYPERGLYCEMIA, WITHOUT LONG-TERM CURRENT USE OF INSULIN: ICD-10-CM

## 2025-01-20 RX ORDER — SEMAGLUTIDE 1.34 MG/ML
1 INJECTION, SOLUTION SUBCUTANEOUS
Qty: 3 ML | Refills: 2 | Status: SHIPPED | OUTPATIENT
Start: 2025-01-26 | End: 2025-04-20

## 2025-03-19 DIAGNOSIS — E11.65 TYPE 2 DIABETES MELLITUS WITH HYPERGLYCEMIA, WITHOUT LONG-TERM CURRENT USE OF INSULIN: ICD-10-CM

## 2025-03-20 RX ORDER — METFORMIN HYDROCHLORIDE 1000 MG/1
1000 TABLET ORAL
Qty: 180 TABLET | Refills: 3 | Status: SHIPPED | OUTPATIENT
Start: 2025-03-20

## 2025-03-26 DIAGNOSIS — E78.2 MIXED HYPERLIPIDEMIA: ICD-10-CM

## 2025-03-27 RX ORDER — ROSUVASTATIN CALCIUM 40 MG/1
40 TABLET, COATED ORAL DAILY
Qty: 90 TABLET | Refills: 1 | Status: SHIPPED | OUTPATIENT
Start: 2025-03-27

## 2025-05-04 DIAGNOSIS — E11.65 TYPE 2 DIABETES MELLITUS WITH HYPERGLYCEMIA, WITHOUT LONG-TERM CURRENT USE OF INSULIN: ICD-10-CM

## 2025-05-05 RX ORDER — SEMAGLUTIDE 1.34 MG/ML
1 INJECTION, SOLUTION SUBCUTANEOUS
Qty: 9 ML | Refills: 0 | Status: SHIPPED | OUTPATIENT
Start: 2025-05-11 | End: 2025-08-03

## 2025-07-08 ENCOUNTER — TELEPHONE (OUTPATIENT)
Dept: PRIMARY CARE | Facility: CLINIC | Age: 65
End: 2025-07-08

## 2025-07-14 ENCOUNTER — DOCUMENTATION (OUTPATIENT)
Dept: PRIMARY CARE | Facility: CLINIC | Age: 65
End: 2025-07-14
Payer: COMMERCIAL

## 2025-07-14 NOTE — PROGRESS NOTES
Message left for Patient to schedule follow up appointment with Dr. Therese foy Urine Lab results.

## 2025-07-28 DIAGNOSIS — I10 ESSENTIAL HYPERTENSION: ICD-10-CM

## 2025-07-28 DIAGNOSIS — G50.0 TRIGEMINAL NEURALGIA: ICD-10-CM

## 2025-07-28 DIAGNOSIS — E11.65 TYPE 2 DIABETES MELLITUS WITH HYPERGLYCEMIA, WITHOUT LONG-TERM CURRENT USE OF INSULIN: ICD-10-CM

## 2025-07-28 DIAGNOSIS — E78.2 MIXED HYPERLIPIDEMIA: ICD-10-CM

## 2025-08-01 RX ORDER — ROSUVASTATIN CALCIUM 40 MG/1
40 TABLET, COATED ORAL DAILY
Qty: 90 TABLET | Refills: 0 | Status: SHIPPED | OUTPATIENT
Start: 2025-08-01

## 2025-08-01 RX ORDER — TIZANIDINE 4 MG/1
4 TABLET ORAL EVERY 6 HOURS PRN
Qty: 30 TABLET | Refills: 3 | Status: SHIPPED | OUTPATIENT
Start: 2025-08-01 | End: 2025-08-31

## 2025-08-01 RX ORDER — SEMAGLUTIDE 1.34 MG/ML
1 INJECTION, SOLUTION SUBCUTANEOUS
Qty: 9 ML | Refills: 0 | Status: SHIPPED | OUTPATIENT
Start: 2025-08-03 | End: 2025-10-26

## 2025-08-01 RX ORDER — LISINOPRIL 20 MG/1
20 TABLET ORAL DAILY
Qty: 90 TABLET | Refills: 0 | Status: SHIPPED | OUTPATIENT
Start: 2025-08-01

## 2025-08-01 RX ORDER — METFORMIN HYDROCHLORIDE 1000 MG/1
1000 TABLET ORAL
Qty: 180 TABLET | Refills: 0 | Status: SHIPPED | OUTPATIENT
Start: 2025-08-01

## 2025-08-21 ENCOUNTER — APPOINTMENT (OUTPATIENT)
Dept: RADIOLOGY | Facility: HOSPITAL | Age: 65
End: 2025-08-21
Payer: COMMERCIAL

## 2025-08-21 ENCOUNTER — APPOINTMENT (OUTPATIENT)
Dept: CARDIOLOGY | Facility: HOSPITAL | Age: 65
End: 2025-08-21
Payer: COMMERCIAL

## 2025-08-21 ENCOUNTER — HOSPITAL ENCOUNTER (EMERGENCY)
Facility: HOSPITAL | Age: 65
Discharge: HOME | End: 2025-08-22
Attending: EMERGENCY MEDICINE
Payer: COMMERCIAL
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
## 2025-08-21 DIAGNOSIS — D72.829 LEUKOCYTOSIS, UNSPECIFIED TYPE: ICD-10-CM

## 2025-08-21 DIAGNOSIS — R91.8 LUNG MASS: ICD-10-CM

## 2025-08-21 DIAGNOSIS — S32.010A COMPRESSION FRACTURE OF L1 VERTEBRA, INITIAL ENCOUNTER (MULTI): Primary | ICD-10-CM

## 2025-08-21 DIAGNOSIS — R42 DIZZINESS: ICD-10-CM

## 2025-08-21 DIAGNOSIS — E13.69 OTHER SPECIFIED DIABETES MELLITUS WITH OTHER SPECIFIED COMPLICATION, WITHOUT LONG-TERM CURRENT USE OF INSULIN: ICD-10-CM

## 2025-08-21 PROBLEM — E11.9 DIABETES MELLITUS (MULTI): Status: ACTIVE | Noted: 2024-02-20

## 2025-08-21 LAB
ALBUMIN SERPL BCP-MCNC: 4 G/DL (ref 3.4–5)
ALP SERPL-CCNC: 78 U/L (ref 33–136)
ALT SERPL W P-5'-P-CCNC: 11 U/L (ref 7–45)
ANION GAP SERPL CALCULATED.3IONS-SCNC: 12 MMOL/L (ref 10–20)
APPEARANCE UR: CLEAR
AST SERPL W P-5'-P-CCNC: 13 U/L (ref 9–39)
BASOPHILS # BLD AUTO: 0.03 X10*3/UL (ref 0–0.1)
BASOPHILS NFR BLD AUTO: 0.2 %
BILIRUB SERPL-MCNC: 0.9 MG/DL (ref 0–1.2)
BILIRUB UR STRIP.AUTO-MCNC: NEGATIVE MG/DL
BUN SERPL-MCNC: 13 MG/DL (ref 6–23)
CALCIUM SERPL-MCNC: 9.6 MG/DL (ref 8.6–10.3)
CHLORIDE SERPL-SCNC: 99 MMOL/L (ref 98–107)
CO2 SERPL-SCNC: 27 MMOL/L (ref 21–32)
COLOR UR: ABNORMAL
CREAT SERPL-MCNC: 1.05 MG/DL (ref 0.5–1.05)
EGFRCR SERPLBLD CKD-EPI 2021: 58 ML/MIN/1.73M*2
EOSINOPHIL # BLD AUTO: 0.71 X10*3/UL (ref 0–0.7)
EOSINOPHIL NFR BLD AUTO: 5.7 %
ERYTHROCYTE [DISTWIDTH] IN BLOOD BY AUTOMATED COUNT: 13 % (ref 11.5–14.5)
GLUCOSE SERPL-MCNC: 417 MG/DL (ref 74–99)
GLUCOSE UR STRIP.AUTO-MCNC: ABNORMAL MG/DL
HCT VFR BLD AUTO: 36.7 % (ref 36–46)
HGB BLD-MCNC: 12 G/DL (ref 12–16)
IMM GRANULOCYTES # BLD AUTO: 0.03 X10*3/UL (ref 0–0.7)
IMM GRANULOCYTES NFR BLD AUTO: 0.2 % (ref 0–0.9)
KETONES UR STRIP.AUTO-MCNC: NEGATIVE MG/DL
LEUKOCYTE ESTERASE UR QL STRIP.AUTO: NEGATIVE
LYMPHOCYTES # BLD AUTO: 2.92 X10*3/UL (ref 1.2–4.8)
LYMPHOCYTES NFR BLD AUTO: 23.5 %
MCH RBC QN AUTO: 28.4 PG (ref 26–34)
MCHC RBC AUTO-ENTMCNC: 32.7 G/DL (ref 32–36)
MCV RBC AUTO: 87 FL (ref 80–100)
MONOCYTES # BLD AUTO: 0.74 X10*3/UL (ref 0.1–1)
MONOCYTES NFR BLD AUTO: 5.9 %
MUCOUS THREADS #/AREA URNS AUTO: NORMAL /LPF
NEUTROPHILS # BLD AUTO: 8.02 X10*3/UL (ref 1.2–7.7)
NEUTROPHILS NFR BLD AUTO: 64.5 %
NITRITE UR QL STRIP.AUTO: NEGATIVE
NRBC BLD-RTO: 0 /100 WBCS (ref 0–0)
PH UR STRIP.AUTO: 5.5 [PH]
PLATELET # BLD AUTO: 279 X10*3/UL (ref 150–450)
POTASSIUM SERPL-SCNC: 4.1 MMOL/L (ref 3.5–5.3)
PROT SERPL-MCNC: 7.5 G/DL (ref 6.4–8.2)
PROT UR STRIP.AUTO-MCNC: ABNORMAL MG/DL
RBC # BLD AUTO: 4.22 X10*6/UL (ref 4–5.2)
RBC # UR STRIP.AUTO: ABNORMAL MG/DL
RBC #/AREA URNS AUTO: NORMAL /HPF
SODIUM SERPL-SCNC: 134 MMOL/L (ref 136–145)
SP GR UR STRIP.AUTO: 1.02
SQUAMOUS #/AREA URNS AUTO: NORMAL /HPF
UROBILINOGEN UR STRIP.AUTO-MCNC: ABNORMAL MG/DL
WBC # BLD AUTO: 12.5 X10*3/UL (ref 4.4–11.3)
WBC #/AREA URNS AUTO: NORMAL /HPF

## 2025-08-21 PROCEDURE — 72125 CT NECK SPINE W/O DYE: CPT | Performed by: RADIOLOGY

## 2025-08-21 PROCEDURE — 96361 HYDRATE IV INFUSION ADD-ON: CPT | Performed by: EMERGENCY MEDICINE

## 2025-08-21 PROCEDURE — 96360 HYDRATION IV INFUSION INIT: CPT | Performed by: EMERGENCY MEDICINE

## 2025-08-21 PROCEDURE — 70450 CT HEAD/BRAIN W/O DYE: CPT | Performed by: RADIOLOGY

## 2025-08-21 PROCEDURE — 2500000001 HC RX 250 WO HCPCS SELF ADMINISTERED DRUGS (ALT 637 FOR MEDICARE OP): Performed by: CLINICAL NURSE SPECIALIST

## 2025-08-21 PROCEDURE — 99285 EMERGENCY DEPT VISIT HI MDM: CPT | Mod: 25 | Performed by: EMERGENCY MEDICINE

## 2025-08-21 PROCEDURE — 93005 ELECTROCARDIOGRAM TRACING: CPT

## 2025-08-21 PROCEDURE — 85025 COMPLETE CBC W/AUTO DIFF WBC: CPT | Performed by: CLINICAL NURSE SPECIALIST

## 2025-08-21 PROCEDURE — 81003 URINALYSIS AUTO W/O SCOPE: CPT | Performed by: CLINICAL NURSE SPECIALIST

## 2025-08-21 PROCEDURE — 70450 CT HEAD/BRAIN W/O DYE: CPT

## 2025-08-21 PROCEDURE — 74176 CT ABD & PELVIS W/O CONTRAST: CPT

## 2025-08-21 PROCEDURE — 36415 COLL VENOUS BLD VENIPUNCTURE: CPT | Performed by: CLINICAL NURSE SPECIALIST

## 2025-08-21 PROCEDURE — 72125 CT NECK SPINE W/O DYE: CPT

## 2025-08-21 PROCEDURE — 2500000004 HC RX 250 GENERAL PHARMACY W/ HCPCS (ALT 636 FOR OP/ED): Performed by: CLINICAL NURSE SPECIALIST

## 2025-08-21 PROCEDURE — 80053 COMPREHEN METABOLIC PANEL: CPT | Performed by: CLINICAL NURSE SPECIALIST

## 2025-08-21 RX ORDER — DOXYCYCLINE 100 MG/1
100 CAPSULE ORAL ONCE
Status: COMPLETED | OUTPATIENT
Start: 2025-08-21 | End: 2025-08-21

## 2025-08-21 RX ORDER — TRAMADOL HYDROCHLORIDE 50 MG/1
50 TABLET, FILM COATED ORAL ONCE
Status: COMPLETED | OUTPATIENT
Start: 2025-08-21 | End: 2025-08-21

## 2025-08-21 RX ORDER — DOXYCYCLINE 100 MG/1
100 TABLET ORAL 2 TIMES DAILY
Qty: 20 TABLET | Refills: 0 | Status: SHIPPED | OUTPATIENT
Start: 2025-08-21 | End: 2025-08-31

## 2025-08-21 RX ORDER — TRAMADOL HYDROCHLORIDE 50 MG/1
50 TABLET, FILM COATED ORAL EVERY 6 HOURS PRN
Qty: 9 TABLET | Refills: 0 | Status: CANCELLED | OUTPATIENT
Start: 2025-08-21 | End: 2025-08-24

## 2025-08-21 RX ORDER — TRAMADOL HYDROCHLORIDE 50 MG/1
50 TABLET, FILM COATED ORAL EVERY 6 HOURS PRN
Qty: 9 TABLET | Refills: 0 | Status: SHIPPED | OUTPATIENT
Start: 2025-08-21

## 2025-08-21 RX ORDER — DOXYCYCLINE 100 MG/1
100 TABLET ORAL 2 TIMES DAILY
Qty: 20 TABLET | Refills: 0 | Status: SHIPPED | OUTPATIENT
Start: 2025-08-21 | End: 2025-08-21

## 2025-08-21 RX ADMIN — SODIUM CHLORIDE 1000 ML: 9 INJECTION, SOLUTION INTRAVENOUS at 21:53

## 2025-08-21 RX ADMIN — DOXYCYCLINE HYCLATE 100 MG: 100 CAPSULE ORAL at 22:58

## 2025-08-21 RX ADMIN — TRAMADOL HYDROCHLORIDE 50 MG: 50 TABLET, COATED ORAL at 22:58

## 2025-08-21 ASSESSMENT — PAIN DESCRIPTION - FREQUENCY: FREQUENCY: CONSTANT/CONTINUOUS

## 2025-08-21 ASSESSMENT — PAIN SCALES - GENERAL: PAINLEVEL_OUTOF10: 9

## 2025-08-21 ASSESSMENT — PAIN - FUNCTIONAL ASSESSMENT: PAIN_FUNCTIONAL_ASSESSMENT: 0-10

## 2025-08-21 ASSESSMENT — PAIN DESCRIPTION - ORIENTATION: ORIENTATION: LEFT

## 2025-08-21 ASSESSMENT — PAIN DESCRIPTION - PROGRESSION: CLINICAL_PROGRESSION: NOT CHANGED

## 2025-08-21 ASSESSMENT — PAIN DESCRIPTION - PAIN TYPE: TYPE: ACUTE PAIN

## 2025-08-21 ASSESSMENT — PAIN DESCRIPTION - LOCATION: LOCATION: BACK

## 2025-08-22 ENCOUNTER — DOCUMENTATION (OUTPATIENT)
Dept: PRIMARY CARE | Facility: CLINIC | Age: 65
End: 2025-08-22
Payer: COMMERCIAL

## 2025-08-22 VITALS
HEART RATE: 105 BPM | RESPIRATION RATE: 16 BRPM | SYSTOLIC BLOOD PRESSURE: 160 MMHG | TEMPERATURE: 96.8 F | OXYGEN SATURATION: 94 % | DIASTOLIC BLOOD PRESSURE: 92 MMHG

## 2025-08-23 LAB
ATRIAL RATE: 109 BPM
P AXIS: 76 DEGREES
P OFFSET: 199 MS
P ONSET: 154 MS
PR INTERVAL: 142 MS
Q ONSET: 225 MS
QRS COUNT: 18 BEATS
QRS DURATION: 70 MS
QT INTERVAL: 346 MS
QTC CALCULATION(BAZETT): 465 MS
QTC FREDERICIA: 422 MS
R AXIS: 49 DEGREES
T AXIS: 73 DEGREES
T OFFSET: 398 MS
VENTRICULAR RATE: 109 BPM

## 2025-08-29 ENCOUNTER — APPOINTMENT (OUTPATIENT)
Dept: PRIMARY CARE | Facility: CLINIC | Age: 65
End: 2025-08-29
Payer: COMMERCIAL

## 2025-09-03 ENCOUNTER — APPOINTMENT (OUTPATIENT)
Dept: ORTHOPEDIC SURGERY | Facility: CLINIC | Age: 65
End: 2025-09-03
Payer: COMMERCIAL

## 2025-09-04 ENCOUNTER — HOSPITAL ENCOUNTER (OUTPATIENT)
Dept: RADIOLOGY | Facility: HOSPITAL | Age: 65
Discharge: HOME | End: 2025-09-04
Payer: COMMERCIAL

## 2025-09-04 ENCOUNTER — OFFICE VISIT (OUTPATIENT)
Dept: ORTHOPEDIC SURGERY | Facility: HOSPITAL | Age: 65
End: 2025-09-04
Payer: COMMERCIAL

## 2025-09-04 DIAGNOSIS — S32.010A COMPRESSION FRACTURE OF L1 VERTEBRA, INITIAL ENCOUNTER (MULTI): ICD-10-CM

## 2025-09-04 PROCEDURE — 1159F MED LIST DOCD IN RCRD: CPT | Performed by: ORTHOPAEDIC SURGERY

## 2025-09-04 PROCEDURE — 99204 OFFICE O/P NEW MOD 45 MIN: CPT | Performed by: ORTHOPAEDIC SURGERY

## 2025-09-04 PROCEDURE — 72110 X-RAY EXAM L-2 SPINE 4/>VWS: CPT | Performed by: RADIOLOGY

## 2025-09-04 PROCEDURE — 4010F ACE/ARB THERAPY RXD/TAKEN: CPT | Performed by: ORTHOPAEDIC SURGERY

## 2025-09-04 PROCEDURE — 99212 OFFICE O/P EST SF 10 MIN: CPT | Performed by: ORTHOPAEDIC SURGERY

## 2025-09-04 PROCEDURE — 1123F ACP DISCUSS/DSCN MKR DOCD: CPT | Performed by: ORTHOPAEDIC SURGERY

## 2025-09-04 PROCEDURE — 72110 X-RAY EXAM L-2 SPINE 4/>VWS: CPT

## 2025-09-04 PROCEDURE — 1036F TOBACCO NON-USER: CPT | Performed by: ORTHOPAEDIC SURGERY

## 2025-09-04 RX ORDER — METHOCARBAMOL 500 MG/1
500 TABLET, FILM COATED ORAL 3 TIMES DAILY PRN
Qty: 90 TABLET | Refills: 0 | Status: SHIPPED | OUTPATIENT
Start: 2025-09-04 | End: 2025-10-04

## 2025-09-04 RX ORDER — NALOXONE HYDROCHLORIDE 4 MG/.1ML
1 SPRAY NASAL AS NEEDED
Qty: 2 EACH | Refills: 0 | Status: SHIPPED | OUTPATIENT
Start: 2025-09-04

## 2025-09-04 RX ORDER — TRAMADOL HYDROCHLORIDE 50 MG/1
50 TABLET, FILM COATED ORAL EVERY 6 HOURS PRN
Qty: 28 TABLET | Refills: 0 | Status: SHIPPED | OUTPATIENT
Start: 2025-09-04 | End: 2025-09-11

## 2025-09-23 ENCOUNTER — APPOINTMENT (OUTPATIENT)
Dept: PRIMARY CARE | Facility: CLINIC | Age: 65
End: 2025-09-23
Payer: COMMERCIAL

## 2026-04-02 ENCOUNTER — APPOINTMENT (OUTPATIENT)
Dept: PRIMARY CARE | Facility: CLINIC | Age: 66
End: 2026-04-02
Payer: COMMERCIAL